# Patient Record
Sex: FEMALE | Race: WHITE | NOT HISPANIC OR LATINO
[De-identification: names, ages, dates, MRNs, and addresses within clinical notes are randomized per-mention and may not be internally consistent; named-entity substitution may affect disease eponyms.]

---

## 2017-06-19 ENCOUNTER — RESULT REVIEW (OUTPATIENT)
Age: 47
End: 2017-06-19

## 2018-05-22 ENCOUNTER — RESULT REVIEW (OUTPATIENT)
Age: 48
End: 2018-05-22

## 2019-12-09 ENCOUNTER — RESULT REVIEW (OUTPATIENT)
Age: 49
End: 2019-12-09

## 2021-04-13 ENCOUNTER — APPOINTMENT (OUTPATIENT)
Dept: SURGERY | Facility: CLINIC | Age: 51
End: 2021-04-13
Payer: COMMERCIAL

## 2021-04-13 VITALS
OXYGEN SATURATION: 97 % | HEIGHT: 61 IN | WEIGHT: 138 LBS | HEART RATE: 87 BPM | DIASTOLIC BLOOD PRESSURE: 81 MMHG | TEMPERATURE: 98.2 F | BODY MASS INDEX: 26.06 KG/M2 | SYSTOLIC BLOOD PRESSURE: 117 MMHG

## 2021-04-13 DIAGNOSIS — Z87.09 PERSONAL HISTORY OF OTHER DISEASES OF THE RESPIRATORY SYSTEM: ICD-10-CM

## 2021-04-13 DIAGNOSIS — Z78.9 OTHER SPECIFIED HEALTH STATUS: ICD-10-CM

## 2021-04-13 DIAGNOSIS — Z80.0 FAMILY HISTORY OF MALIGNANT NEOPLASM OF DIGESTIVE ORGANS: ICD-10-CM

## 2021-04-13 DIAGNOSIS — Z86.39 PERSONAL HISTORY OF OTHER ENDOCRINE, NUTRITIONAL AND METABOLIC DISEASE: ICD-10-CM

## 2021-04-13 DIAGNOSIS — T78.2XXA ANAPHYLACTIC SHOCK, UNSPECIFIED, INITIAL ENCOUNTER: ICD-10-CM

## 2021-04-13 PROCEDURE — 99072 ADDL SUPL MATRL&STAF TM PHE: CPT

## 2021-04-13 PROCEDURE — 99204 OFFICE O/P NEW MOD 45 MIN: CPT

## 2021-04-13 RX ORDER — ROSUVASTATIN CALCIUM 10 MG/1
10 TABLET, FILM COATED ORAL
Refills: 0 | Status: ACTIVE | COMMUNITY

## 2021-04-13 NOTE — REVIEW OF SYSTEMS
[Fever] : no fever [Chills] : no chills [Feeling Poorly] : feeling poorly [Feeling Tired] : not feeling tired [Recent Weight Gain (___ Lbs)] : no recent weight gain [Recent Weight Loss (___ Lbs)] : recent [unfilled] ~Ulb weight loss [Abdominal Pain] : abdominal pain [Vomiting] : no vomiting [Constipation] : no constipation [Diarrhea] : no diarrhea [Heartburn] : no heartburn [Melena] : no melena [Negative] : Heme/Lymph

## 2021-04-13 NOTE — HISTORY OF PRESENT ILLNESS
[de-identified] : right upper quadrant pain [de-identified] : 51 year old white female who presents c/o episodes of RUQ pain for the last three months. Pt states her pain is increased with eating without alleviating factors. Her pain is midepigastric without radiation. She get nauseous, without vomiting. She denies fevers, chills or night sweats. She has not had any episodes of jaundice. Family history is negative for gallbladder disease. Pt had a full work up including a sonogram that was negative, a ct scan that showed sludge in the gallbladder, a cck hida that showed an EF of 37 percent and an EGD that was negative. She also had a trial of multiple meds, including antispasmodics, without relief.

## 2021-04-13 NOTE — ASSESSMENT
[FreeTextEntry1] : Sonogram, ct scan of abdomen and pelvis, CCK HIDA scan and labs reviewed. I have also discussed with pt doctor, Dr Foy< and her EGD was negative. Her amylase was fractionated and it was pancreatic. \par I have discussed with pt and sent her for a second GI opion with Dr Lam

## 2021-04-13 NOTE — PHYSICAL EXAM
[JVD] : no jugular venous distention  [Normal Thyroid] : the thyroid was normal [Carotid Bruits] : no carotid bruits [Normal Breath Sounds] : Normal breath sounds [Normal Heart Sounds] : normal heart sounds [Normal Rate and Rhythm] : normal rate and rhythm [No Rash or Lesion] : No rash or lesion [Alert] : alert [Oriented to Person] : oriented to person [Oriented to Place] : oriented to place [Oriented to Time] : oriented to time [Calm] : calm [de-identified] : well developed white female in no acute distress [de-identified] : noninjected and nonicteric [de-identified] : without adenopathy [de-identified] : refused [de-identified] : normal bowel sounds, without distension, with midepigastric tenderness [de-identified] : without hernia [de-identified] : refused [de-identified] : without calf pain

## 2021-04-15 ENCOUNTER — APPOINTMENT (OUTPATIENT)
Dept: SURGERY | Facility: CLINIC | Age: 51
End: 2021-04-15
Payer: COMMERCIAL

## 2021-04-15 VITALS — TEMPERATURE: 97.2 F

## 2021-04-15 PROCEDURE — 99072 ADDL SUPL MATRL&STAF TM PHE: CPT

## 2021-04-15 PROCEDURE — 99214 OFFICE O/P EST MOD 30 MIN: CPT

## 2021-04-15 NOTE — HISTORY OF PRESENT ILLNESS
[de-identified] : right upper quadrant pain [de-identified] : 51 year old white female who presents c/o episodes of RUQ pain for the last three months. Pt states her pain is increased with eating without alleviating factors. Her pain is midepigastric without radiation. She get nauseous, without vomiting. She denies fevers, chills or night sweats. She has not had any episodes of jaundice. Family history is negative for gallbladder disease. Pt had a full work up including a sonogram that was negative, a ct scan that showed sludge in the gallbladder, a cck hida that showed an EF of 37 percent and an EGD that was negative. She also had a trial of multiple meds, including antispasmodics, without relief.\par Followup; pt was seen by Dr Torres for a second opinion and agrees that she should have her gallbladder removed.

## 2021-04-15 NOTE — ASSESSMENT
[FreeTextEntry1] : I have discussed with pt and  that her indications of a gallbladder problem are conflicting, ie normal sonogram and sludge on ct scan, low EF on CCK HIDA scan but still in normal range. Pt had a full GI workup including an EGD, sonogram, ct scan, CCK HIDA and MRI and I also sent her for a second GI opinion who agrees that she need a laparoscopic cholecystectomy. I have also discussed with them that surgery may not relieve all of her symptoms. Both understand and risks, benefits and alternatives have been discussed. Ms Goldberg wants to schedule a laparoscopic cholecystectomy.

## 2021-04-15 NOTE — PHYSICAL EXAM
[Normal Breath Sounds] : Normal breath sounds [Normal Heart Sounds] : normal heart sounds [No Rash or Lesion] : No rash or lesion [Alert] : alert [Oriented to Person] : oriented to person [Oriented to Place] : oriented to place [Oriented to Time] : oriented to time [Calm] : calm [de-identified] : well developed white female in no acute distress [de-identified] : nonicteric [de-identified] : normal bowel sounds, without distension, with RUQ tenderness [de-identified] : without calf pain or swelling.

## 2021-04-19 ENCOUNTER — OUTPATIENT (OUTPATIENT)
Dept: OUTPATIENT SERVICES | Facility: HOSPITAL | Age: 51
LOS: 1 days | End: 2021-04-19
Payer: COMMERCIAL

## 2021-04-19 VITALS
OXYGEN SATURATION: 99 % | HEIGHT: 61 IN | HEART RATE: 97 BPM | TEMPERATURE: 98 F | WEIGHT: 134.04 LBS | RESPIRATION RATE: 17 BRPM | DIASTOLIC BLOOD PRESSURE: 86 MMHG | SYSTOLIC BLOOD PRESSURE: 128 MMHG

## 2021-04-19 DIAGNOSIS — R10.11 RIGHT UPPER QUADRANT PAIN: ICD-10-CM

## 2021-04-19 DIAGNOSIS — Z98.89 OTHER SPECIFIED POSTPROCEDURAL STATES: Chronic | ICD-10-CM

## 2021-04-19 DIAGNOSIS — Z01.818 ENCOUNTER FOR OTHER PREPROCEDURAL EXAMINATION: ICD-10-CM

## 2021-04-19 LAB
ALBUMIN SERPL ELPH-MCNC: 4.1 G/DL — SIGNIFICANT CHANGE UP (ref 3.3–5)
ALP SERPL-CCNC: 83 U/L — SIGNIFICANT CHANGE UP (ref 40–120)
ALT FLD-CCNC: 19 U/L — SIGNIFICANT CHANGE UP (ref 12–78)
ANION GAP SERPL CALC-SCNC: 4 MMOL/L — LOW (ref 5–17)
AST SERPL-CCNC: 16 U/L — SIGNIFICANT CHANGE UP (ref 15–37)
BILIRUB SERPL-MCNC: 0.9 MG/DL — SIGNIFICANT CHANGE UP (ref 0.2–1.2)
BUN SERPL-MCNC: 14 MG/DL — SIGNIFICANT CHANGE UP (ref 7–23)
CALCIUM SERPL-MCNC: 9.6 MG/DL — SIGNIFICANT CHANGE UP (ref 8.5–10.1)
CHLORIDE SERPL-SCNC: 106 MMOL/L — SIGNIFICANT CHANGE UP (ref 96–108)
CO2 SERPL-SCNC: 31 MMOL/L — SIGNIFICANT CHANGE UP (ref 22–31)
CREAT SERPL-MCNC: 0.77 MG/DL — SIGNIFICANT CHANGE UP (ref 0.5–1.3)
GLUCOSE SERPL-MCNC: 81 MG/DL — SIGNIFICANT CHANGE UP (ref 70–99)
HCT VFR BLD CALC: 42.6 % — SIGNIFICANT CHANGE UP (ref 34.5–45)
HGB BLD-MCNC: 14.1 G/DL — SIGNIFICANT CHANGE UP (ref 11.5–15.5)
MCHC RBC-ENTMCNC: 28.9 PG — SIGNIFICANT CHANGE UP (ref 27–34)
MCHC RBC-ENTMCNC: 33.1 GM/DL — SIGNIFICANT CHANGE UP (ref 32–36)
MCV RBC AUTO: 87.3 FL — SIGNIFICANT CHANGE UP (ref 80–100)
NRBC # BLD: 0 /100 WBCS — SIGNIFICANT CHANGE UP (ref 0–0)
PLATELET # BLD AUTO: 271 K/UL — SIGNIFICANT CHANGE UP (ref 150–400)
POTASSIUM SERPL-MCNC: 4.1 MMOL/L — SIGNIFICANT CHANGE UP (ref 3.5–5.3)
POTASSIUM SERPL-SCNC: 4.1 MMOL/L — SIGNIFICANT CHANGE UP (ref 3.5–5.3)
PROT SERPL-MCNC: 7.6 G/DL — SIGNIFICANT CHANGE UP (ref 6–8.3)
RBC # BLD: 4.88 M/UL — SIGNIFICANT CHANGE UP (ref 3.8–5.2)
RBC # FLD: 12.4 % — SIGNIFICANT CHANGE UP (ref 10.3–14.5)
SARS-COV-2 RNA SPEC QL NAA+PROBE: SIGNIFICANT CHANGE UP
SODIUM SERPL-SCNC: 141 MMOL/L — SIGNIFICANT CHANGE UP (ref 135–145)
WBC # BLD: 5.11 K/UL — SIGNIFICANT CHANGE UP (ref 3.8–10.5)
WBC # FLD AUTO: 5.11 K/UL — SIGNIFICANT CHANGE UP (ref 3.8–10.5)

## 2021-04-19 PROCEDURE — 86901 BLOOD TYPING SEROLOGIC RH(D): CPT

## 2021-04-19 PROCEDURE — U0005: CPT

## 2021-04-19 PROCEDURE — 86850 RBC ANTIBODY SCREEN: CPT

## 2021-04-19 PROCEDURE — U0003: CPT

## 2021-04-19 PROCEDURE — 80053 COMPREHEN METABOLIC PANEL: CPT

## 2021-04-19 PROCEDURE — G0463: CPT

## 2021-04-19 PROCEDURE — 36415 COLL VENOUS BLD VENIPUNCTURE: CPT

## 2021-04-19 PROCEDURE — 86900 BLOOD TYPING SEROLOGIC ABO: CPT

## 2021-04-19 PROCEDURE — 85027 COMPLETE CBC AUTOMATED: CPT

## 2021-04-19 NOTE — H&P PST ADULT - MUSCULOSKELETAL COMMENTS
Right knee -  mild edema, mild tenderness to medial aspect, no erythema Occasional lower back pain, See HPI

## 2021-04-19 NOTE — H&P PST ADULT - NSICDXFAMILYHX_GEN_ALL_CORE_FT
FAMILY HISTORY:  Father  Still living? Yes, Estimated age: 71-80  History of hypertension, Age at diagnosis: Age Unknown

## 2021-04-19 NOTE — H&P PST ADULT - ASSESSMENT
This51 yo female with a PMHX of HLD, Mild Asthma, premature ovarian failure and Tourettes   presents to PST for a scheduled Laparoscopic Cholecystectomy with Dr Sharif  on 4/21/21 .

## 2021-04-19 NOTE — H&P PST ADULT - NSANTHOSAYNRD_GEN_A_CORE
No. ÁLVARO screening performed.  STOP BANG Legend: 0-2 = LOW Risk; 3-4 = INTERMEDIATE Risk; 5-8 = HIGH Risk

## 2021-04-19 NOTE — H&P PST ADULT - HISTORY OF PRESENT ILLNESS
This   presents to PST for a scheduled   with Dr trujillo  .  This51 yo female with a PMHX of HLD, Mild Asthma, premature ovarian failure and Tourette's   presents to [facility] for a scheduled Laparoscopic Cholecystectomy with Dr Sharif  on 4/21/21 . She developed abdominal discomfort and bloating in 2/21 which "was not like my normal lactose intolerance and GI stuff". Her workup showed GB disfunction at 37%. She is easily nauseated with any food.  She denies any fever,chills, abdominal pain, changes in bowels today. Pt is electing to have this procedure.

## 2021-04-19 NOTE — H&P PST ADULT - NSICDXPASTSURGICALHX_GEN_ALL_CORE_FT
PAST SURGICAL HISTORY:  History of arthroscopy of both knees right 20 yrs ago , left 2013    S/P carpal tunnel release (Right, 2015 & Left 4/2016)

## 2021-04-19 NOTE — H&P PST ADULT - NSICDXPROBLEM_GEN_ALL_CORE_FT
PROBLEM DIAGNOSES  Problem: RUQ pain  Assessment and Plan: PST: CBC,CMP,T&S  Medical evaluation with Dr. Gomez  All preoperative instructions including CHD cleanse reviewed with patient who verbalized understanding.   Avoid all NSAID/ASA containing products as well as all herbal/vitamin supplements. Tylenol only for pain/headache.   Covid swab at [place] date TBD. Pt verbalized understanding.

## 2021-04-19 NOTE — H&P PST ADULT - NSICDXPASTMEDICALHX_GEN_ALL_CORE_FT
PAST MEDICAL HISTORY:  Asthma (Only with seasonal allergies)    Fibromyalgia     History of hyperlipidemia     Tourettes disorder ("Mild tic")

## 2021-04-20 ENCOUNTER — TRANSCRIPTION ENCOUNTER (OUTPATIENT)
Age: 51
End: 2021-04-20

## 2021-04-20 NOTE — ASU PATIENT PROFILE, ADULT - PSH
History of arthroscopy of both knees  right 20 yrs ago , left 2013  S/P carpal tunnel release  (Right, 2015 & Left 4/2016)

## 2021-04-20 NOTE — ASU PATIENT PROFILE, ADULT - PMH
Asthma  (Only with seasonal allergies)  Fibromyalgia    History of hyperlipidemia    Tourettes disorder  ("Mild tic")

## 2021-04-21 ENCOUNTER — OUTPATIENT (OUTPATIENT)
Dept: OUTPATIENT SERVICES | Facility: HOSPITAL | Age: 51
LOS: 1 days | End: 2021-04-21
Payer: COMMERCIAL

## 2021-04-21 ENCOUNTER — RESULT REVIEW (OUTPATIENT)
Age: 51
End: 2021-04-21

## 2021-04-21 ENCOUNTER — APPOINTMENT (OUTPATIENT)
Dept: SURGERY | Facility: HOSPITAL | Age: 51
End: 2021-04-21

## 2021-04-21 VITALS
SYSTOLIC BLOOD PRESSURE: 148 MMHG | RESPIRATION RATE: 20 BRPM | DIASTOLIC BLOOD PRESSURE: 85 MMHG | WEIGHT: 138.01 LBS | HEIGHT: 61.6 IN | OXYGEN SATURATION: 97 % | TEMPERATURE: 98 F | HEART RATE: 86 BPM

## 2021-04-21 VITALS
SYSTOLIC BLOOD PRESSURE: 126 MMHG | OXYGEN SATURATION: 100 % | HEART RATE: 74 BPM | RESPIRATION RATE: 13 BRPM | DIASTOLIC BLOOD PRESSURE: 74 MMHG

## 2021-04-21 DIAGNOSIS — Z98.89 OTHER SPECIFIED POSTPROCEDURAL STATES: Chronic | ICD-10-CM

## 2021-04-21 DIAGNOSIS — R10.11 RIGHT UPPER QUADRANT PAIN: ICD-10-CM

## 2021-04-21 PROCEDURE — 47562 LAPAROSCOPIC CHOLECYSTECTOMY: CPT

## 2021-04-21 PROCEDURE — 88304 TISSUE EXAM BY PATHOLOGIST: CPT | Mod: 26

## 2021-04-21 PROCEDURE — 88304 TISSUE EXAM BY PATHOLOGIST: CPT

## 2021-04-21 PROCEDURE — 47562 LAPAROSCOPIC CHOLECYSTECTOMY: CPT | Mod: AS

## 2021-04-21 PROCEDURE — C1889: CPT

## 2021-04-21 RX ORDER — METOCLOPRAMIDE HCL 10 MG
5 TABLET ORAL ONCE
Refills: 0 | Status: COMPLETED | OUTPATIENT
Start: 2021-04-21 | End: 2021-04-21

## 2021-04-21 RX ORDER — OXYCODONE AND ACETAMINOPHEN 5; 325 MG/1; MG/1
1 TABLET ORAL
Qty: 15 | Refills: 0
Start: 2021-04-21

## 2021-04-21 RX ORDER — HYDROMORPHONE HYDROCHLORIDE 2 MG/ML
0.5 INJECTION INTRAMUSCULAR; INTRAVENOUS; SUBCUTANEOUS
Refills: 0 | Status: DISCONTINUED | OUTPATIENT
Start: 2021-04-21 | End: 2021-04-21

## 2021-04-21 RX ORDER — SODIUM CHLORIDE 9 MG/ML
1000 INJECTION, SOLUTION INTRAVENOUS
Refills: 0 | Status: DISCONTINUED | OUTPATIENT
Start: 2021-04-21 | End: 2021-04-21

## 2021-04-21 RX ORDER — OXYCODONE HYDROCHLORIDE 5 MG/1
5 TABLET ORAL ONCE
Refills: 0 | Status: DISCONTINUED | OUTPATIENT
Start: 2021-04-21 | End: 2021-04-21

## 2021-04-21 RX ADMIN — HYDROMORPHONE HYDROCHLORIDE 0.5 MILLIGRAM(S): 2 INJECTION INTRAMUSCULAR; INTRAVENOUS; SUBCUTANEOUS at 11:52

## 2021-04-21 RX ADMIN — SODIUM CHLORIDE 100 MILLILITER(S): 9 INJECTION, SOLUTION INTRAVENOUS at 11:41

## 2021-04-21 RX ADMIN — HYDROMORPHONE HYDROCHLORIDE 0.5 MILLIGRAM(S): 2 INJECTION INTRAMUSCULAR; INTRAVENOUS; SUBCUTANEOUS at 12:17

## 2021-04-21 RX ADMIN — HYDROMORPHONE HYDROCHLORIDE 0.5 MILLIGRAM(S): 2 INJECTION INTRAMUSCULAR; INTRAVENOUS; SUBCUTANEOUS at 12:02

## 2021-04-21 RX ADMIN — HYDROMORPHONE HYDROCHLORIDE 0.5 MILLIGRAM(S): 2 INJECTION INTRAMUSCULAR; INTRAVENOUS; SUBCUTANEOUS at 12:34

## 2021-04-21 RX ADMIN — HYDROMORPHONE HYDROCHLORIDE 0.5 MILLIGRAM(S): 2 INJECTION INTRAMUSCULAR; INTRAVENOUS; SUBCUTANEOUS at 12:07

## 2021-04-21 RX ADMIN — Medication 5 MILLIGRAM(S): at 11:51

## 2021-04-21 RX ADMIN — HYDROMORPHONE HYDROCHLORIDE 0.5 MILLIGRAM(S): 2 INJECTION INTRAMUSCULAR; INTRAVENOUS; SUBCUTANEOUS at 12:24

## 2021-04-21 NOTE — ASU DISCHARGE PLAN (ADULT/PEDIATRIC) - CARE PROVIDER_API CALL
Osmel Sharif)  Surgery  [address] [place] [address] [phone]  Phone: [phone]  Fax: [phone]  Follow Up Time:

## 2021-04-21 NOTE — BRIEF OPERATIVE NOTE - NSICDXBRIEFPOSTOP_GEN_ALL_CORE_FT
[facility] POST-OP DIAGNOSIS:  Biliary dyskinesia 21-Apr-2021 11:37:50  Roxana Damon   [facility]

## 2021-04-22 LAB — SURGICAL PATHOLOGY STUDY: SIGNIFICANT CHANGE UP

## 2021-04-24 ENCOUNTER — EMERGENCY (EMERGENCY)
Facility: HOSPITAL | Age: 51
LOS: 1 days | Discharge: HOME | End: 2021-04-24
Attending: EMERGENCY MEDICINE | Admitting: EMERGENCY MEDICINE
Payer: COMMERCIAL

## 2021-04-24 VITALS
HEIGHT: 61.6 IN | TEMPERATURE: 97 F | DIASTOLIC BLOOD PRESSURE: 97 MMHG | WEIGHT: 136.03 LBS | OXYGEN SATURATION: 97 % | SYSTOLIC BLOOD PRESSURE: 144 MMHG | RESPIRATION RATE: 16 BRPM | HEART RATE: 78 BPM

## 2021-04-24 VITALS
SYSTOLIC BLOOD PRESSURE: 134 MMHG | RESPIRATION RATE: 15 BRPM | TEMPERATURE: 98 F | HEART RATE: 69 BPM | OXYGEN SATURATION: 99 % | DIASTOLIC BLOOD PRESSURE: 89 MMHG

## 2021-04-24 DIAGNOSIS — Z98.89 OTHER SPECIFIED POSTPROCEDURAL STATES: Chronic | ICD-10-CM

## 2021-04-24 DIAGNOSIS — R10.9 UNSPECIFIED ABDOMINAL PAIN: ICD-10-CM

## 2021-04-24 DIAGNOSIS — Z90.49 ACQUIRED ABSENCE OF OTHER SPECIFIED PARTS OF DIGESTIVE TRACT: Chronic | ICD-10-CM

## 2021-04-24 LAB
ALBUMIN SERPL ELPH-MCNC: 3.4 G/DL — SIGNIFICANT CHANGE UP (ref 3.3–5)
ALP SERPL-CCNC: 104 U/L — SIGNIFICANT CHANGE UP (ref 40–120)
ALT FLD-CCNC: 262 U/L — HIGH (ref 12–78)
ANION GAP SERPL CALC-SCNC: 5 MMOL/L — SIGNIFICANT CHANGE UP (ref 5–17)
APTT BLD: 29.3 SEC — SIGNIFICANT CHANGE UP (ref 27.5–35.5)
AST SERPL-CCNC: 201 U/L — HIGH (ref 15–37)
BASOPHILS # BLD AUTO: 0.03 K/UL — SIGNIFICANT CHANGE UP (ref 0–0.2)
BASOPHILS NFR BLD AUTO: 0.5 % — SIGNIFICANT CHANGE UP (ref 0–2)
BILIRUB SERPL-MCNC: 0.5 MG/DL — SIGNIFICANT CHANGE UP (ref 0.2–1.2)
BUN SERPL-MCNC: 10 MG/DL — SIGNIFICANT CHANGE UP (ref 7–23)
CALCIUM SERPL-MCNC: 8.5 MG/DL — SIGNIFICANT CHANGE UP (ref 8.5–10.1)
CHLORIDE SERPL-SCNC: 107 MMOL/L — SIGNIFICANT CHANGE UP (ref 96–108)
CO2 SERPL-SCNC: 30 MMOL/L — SIGNIFICANT CHANGE UP (ref 22–31)
CREAT SERPL-MCNC: 0.88 MG/DL — SIGNIFICANT CHANGE UP (ref 0.5–1.3)
EOSINOPHIL # BLD AUTO: 0.19 K/UL — SIGNIFICANT CHANGE UP (ref 0–0.5)
EOSINOPHIL NFR BLD AUTO: 3.1 % — SIGNIFICANT CHANGE UP (ref 0–6)
GLUCOSE SERPL-MCNC: 91 MG/DL — SIGNIFICANT CHANGE UP (ref 70–99)
HCG SERPL-ACNC: 4 MIU/ML — SIGNIFICANT CHANGE UP
HCT VFR BLD CALC: 37.4 % — SIGNIFICANT CHANGE UP (ref 34.5–45)
HGB BLD-MCNC: 12.5 G/DL — SIGNIFICANT CHANGE UP (ref 11.5–15.5)
IMM GRANULOCYTES NFR BLD AUTO: 0.2 % — SIGNIFICANT CHANGE UP (ref 0–1.5)
INR BLD: 1.01 RATIO — SIGNIFICANT CHANGE UP (ref 0.88–1.16)
LIDOCAIN IGE QN: 88 U/L — SIGNIFICANT CHANGE UP (ref 73–393)
LYMPHOCYTES # BLD AUTO: 1.63 K/UL — SIGNIFICANT CHANGE UP (ref 1–3.3)
LYMPHOCYTES # BLD AUTO: 26.8 % — SIGNIFICANT CHANGE UP (ref 13–44)
MCHC RBC-ENTMCNC: 29.3 PG — SIGNIFICANT CHANGE UP (ref 27–34)
MCHC RBC-ENTMCNC: 33.4 GM/DL — SIGNIFICANT CHANGE UP (ref 32–36)
MCV RBC AUTO: 87.8 FL — SIGNIFICANT CHANGE UP (ref 80–100)
MONOCYTES # BLD AUTO: 0.57 K/UL — SIGNIFICANT CHANGE UP (ref 0–0.9)
MONOCYTES NFR BLD AUTO: 9.4 % — SIGNIFICANT CHANGE UP (ref 2–14)
NEUTROPHILS # BLD AUTO: 3.66 K/UL — SIGNIFICANT CHANGE UP (ref 1.8–7.4)
NEUTROPHILS NFR BLD AUTO: 60 % — SIGNIFICANT CHANGE UP (ref 43–77)
NRBC # BLD: 0 /100 WBCS — SIGNIFICANT CHANGE UP (ref 0–0)
PLATELET # BLD AUTO: 230 K/UL — SIGNIFICANT CHANGE UP (ref 150–400)
POTASSIUM SERPL-MCNC: 4 MMOL/L — SIGNIFICANT CHANGE UP (ref 3.5–5.3)
POTASSIUM SERPL-SCNC: 4 MMOL/L — SIGNIFICANT CHANGE UP (ref 3.5–5.3)
PROT SERPL-MCNC: 6.4 G/DL — SIGNIFICANT CHANGE UP (ref 6–8.3)
PROTHROM AB SERPL-ACNC: 11.8 SEC — SIGNIFICANT CHANGE UP (ref 10.6–13.6)
RBC # BLD: 4.26 M/UL — SIGNIFICANT CHANGE UP (ref 3.8–5.2)
RBC # FLD: 12.6 % — SIGNIFICANT CHANGE UP (ref 10.3–14.5)
SODIUM SERPL-SCNC: 142 MMOL/L — SIGNIFICANT CHANGE UP (ref 135–145)
WBC # BLD: 6.09 K/UL — SIGNIFICANT CHANGE UP (ref 3.8–10.5)
WBC # FLD AUTO: 6.09 K/UL — SIGNIFICANT CHANGE UP (ref 3.8–10.5)

## 2021-04-24 PROCEDURE — 96375 TX/PRO/DX INJ NEW DRUG ADDON: CPT

## 2021-04-24 PROCEDURE — 36415 COLL VENOUS BLD VENIPUNCTURE: CPT

## 2021-04-24 PROCEDURE — 99284 EMERGENCY DEPT VISIT MOD MDM: CPT | Mod: 25

## 2021-04-24 PROCEDURE — 74176 CT ABD & PELVIS W/O CONTRAST: CPT

## 2021-04-24 PROCEDURE — 85730 THROMBOPLASTIN TIME PARTIAL: CPT

## 2021-04-24 PROCEDURE — 84702 CHORIONIC GONADOTROPIN TEST: CPT

## 2021-04-24 PROCEDURE — 83690 ASSAY OF LIPASE: CPT

## 2021-04-24 PROCEDURE — 80053 COMPREHEN METABOLIC PANEL: CPT

## 2021-04-24 PROCEDURE — 74176 CT ABD & PELVIS W/O CONTRAST: CPT | Mod: 26,MA

## 2021-04-24 PROCEDURE — 99053 MED SERV 10PM-8AM 24 HR FAC: CPT

## 2021-04-24 PROCEDURE — 85025 COMPLETE CBC W/AUTO DIFF WBC: CPT

## 2021-04-24 PROCEDURE — 96374 THER/PROPH/DIAG INJ IV PUSH: CPT

## 2021-04-24 PROCEDURE — 99285 EMERGENCY DEPT VISIT HI MDM: CPT

## 2021-04-24 PROCEDURE — 85610 PROTHROMBIN TIME: CPT

## 2021-04-24 PROCEDURE — 99282 EMERGENCY DEPT VISIT SF MDM: CPT

## 2021-04-24 RX ORDER — MORPHINE SULFATE 50 MG/1
4 CAPSULE, EXTENDED RELEASE ORAL ONCE
Refills: 0 | Status: DISCONTINUED | OUTPATIENT
Start: 2021-04-24 | End: 2021-04-24

## 2021-04-24 RX ORDER — ONDANSETRON 8 MG/1
4 TABLET, FILM COATED ORAL ONCE
Refills: 0 | Status: COMPLETED | OUTPATIENT
Start: 2021-04-24 | End: 2021-04-24

## 2021-04-24 RX ADMIN — MORPHINE SULFATE 4 MILLIGRAM(S): 50 CAPSULE, EXTENDED RELEASE ORAL at 01:03

## 2021-04-24 RX ADMIN — ONDANSETRON 4 MILLIGRAM(S): 8 TABLET, FILM COATED ORAL at 01:03

## 2021-04-24 NOTE — ED PROVIDER NOTE - CLINICAL SUMMARY MEDICAL DECISION MAKING FREE TEXT BOX
r/o surgical complication r/o sbo, CT /ap, labs, pt declining analgesia/antiemetics, surgical PA called for consult

## 2021-04-24 NOTE — ED PROVIDER NOTE - NSFOLLOWUPINSTRUCTIONS_ED_ALL_ED_FT
1) Follow-up with Dr. Sharif. Call today / next business day for prompt follow-up.  2) Return to Emergency room for any worsening or persistent pain, weakness, fever, or any other concerning symptoms.  3) See attached instruction sheets for additional information, including information regarding signs and symptoms to look out for, reasons to seek immediate care and other important instructions.  4) Follow-up with any specialists as discussed / noted as well.      WHAT YOU NEED TO KNOW:    Abdominal pain can be dull, achy, or sharp. You may have pain in one area of your abdomen, or in your entire abdomen. Your pain may be caused by a condition such as constipation, food sensitivity or poisoning, infection, or a blockage. Abdominal pain can also be from a hernia, appendicitis, or an ulcer. Liver, gallbladder, or kidney conditions can also cause abdominal pain. The cause of your abdominal pain may be unknown.     DISCHARGE INSTRUCTIONS:    Return to the emergency department if:   •You have new chest pain or shortness of breath.      •You have pulsing pain in your upper abdomen or lower back that suddenly becomes constant.      •Your pain is in the right lower abdominal area and worsens with movement.       •You have a fever over 100.4°F (38°C) or shaking chills.       •You are vomiting and cannot keep food or liquids down.       •Your pain does not improve or gets worse over the next 8 to 12 hours.       •You see blood in your vomit or bowel movements, or they look black and tarry.       •Your skin or the whites of your eyes turn yellow.       •You are a woman and have a large amount of vaginal bleeding that is not your monthly period.       Contact your healthcare provider if:   •You have pain in your lower back.       •You are a man and have pain in your testicles.      •You have pain when you urinate.       •You have questions or concerns about your condition or care.      Follow up with your healthcare provider within 24 hours or as directed: Write down your questions so you remember to ask them during your visits.     Medicines:   •Medicines may be given to calm your stomach and prevent vomiting or to decrease pain. Ask how to take pain medicine safely.      •Take your medicine as directed. Contact your healthcare provider if you think your medicine is not helping or if you have side effects. Tell him of her if you are allergic to any medicine. Keep a list of the medicines, vitamins, and herbs you take. Include the amounts, and when and why you take them. Bring the list or the pill bottles to follow-up visits. Carry your medicine list with you in case of an emergency.

## 2021-04-24 NOTE — ED PROVIDER NOTE - PROGRESS NOTE DETAILS
discussed case with surgical PA, recommend f/u outpatient with Dr. Sharif discussed case with surgical PA, recommend f/u outpatient with Dr. Sharif, patient agreeable with plan has f/u on Monday, feels better, but still in some gassy discomfort, CT findings explained to patient and

## 2021-04-24 NOTE — ED PROVIDER NOTE - PATIENT PORTAL LINK FT
You can access the FollowMyHealth Patient Portal offered by [facility] by registering at the following website: [email or web address]. By joining [employer]’s FollowMyHealth portal, you will also be able to view your health information using other applications (apps) compatible with our system.

## 2021-04-24 NOTE — ED PROVIDER NOTE - PHYSICAL EXAMINATION
Gen: Alert, NAD  Head/eyes: NC/AT, PERRL  ENT: airway patent  Neck: supple, no tenderness/meningismus/JVD, Trachea midline  Pulm/lung: Bilateral clear BS, normal resp effort, no wheeze/stridor/retractions  CV/heart: RRR, no M/R/G, +2 dist pulses (radial, pedal DP/PT, popliteal)  GI/Abd: soft, +ttp mid abdomen, laparoscopic incision site x 4 c/d/i, +mildly distended, +BS, no guarding/rebound tenderness  Musculoskeletal: no edema/erythema/cyanosis, FROM in all extremities, no C/T/L spine ttp  Skin: no rash, no vesicles, no petechaie, no ecchymosis, no swelling  Neuro: AAOx3, CN 2-12 intact, normal sensation, 5/5 motor strength in all extremities, normal gait

## 2021-04-24 NOTE — ED ADULT NURSE NOTE - PSH
History of arthroscopy of both knees  right 20 yrs ago , left 2013  S/P carpal tunnel release  (Right, 2015 & Left 4/2016)  S/P cholecystectomy

## 2021-04-24 NOTE — ED ADULT NURSE NOTE - OBJECTIVE STATEMENT
pt s/p edgar with Dr Sharif this past Wednesday.  was out pt amb surg.  pt went home same day without complications.  pt c/o persistent bloating and generalized abd pain.  -nausea -fever has been taking motrin and percocet with minimal relief.  laparoscopic dressings dry and intact.

## 2021-04-24 NOTE — ED PROVIDER NOTE - OBJECTIVE STATEMENT
50 yo female s/p cholecystectomy by Dr. Sharif 4/21 c/o abdominal bloating/pain, distention, moderate, nonradiating, worse with movement.  No n/v/d.  Admits to slight low grade fever yesterday 100 that went away on its own.  Taking percocet and zofran.

## 2021-04-24 NOTE — ED ADULT NURSE REASSESSMENT NOTE - NS ED NURSE REASSESS COMMENT FT1
pt evaluated at bedside by Dr Aguilera.  IV inserted, blood work drawn and sent to lab.  medicated for pain and nausea, transported to CT scan via stretcher.  plan to be evaluated at bedside by surgical PA.

## 2021-04-24 NOTE — CONSULT NOTE ADULT - SUBJECTIVE AND OBJECTIVE BOX
50 y/o F pmhx HLD, fibromyalgia, tourettes, s/p lap cholecystectomy 4/21 presents to [facility] ED c/o abd pain that has not been improving since after her surgery. PT states the pain is predominantly around the umbilical incision and c/o bloating. PT admits to flatus and regular bowel movements. PT denies chest pain, SOB, palpitations, fevers, chills , melena, or hematochezia.     PAST MEDICAL & SURGICAL HISTORY:  History of hyperlipidemia    Fibromyalgia    Tourettes disorder  (&quot;Mild tic&quot;)    Asthma  (Only with seasonal allergies)    History of arthroscopy of both knees  right 20 yrs ago , left 2013    S/P carpal tunnel release  (Right, 2015 &amp; Left 4/2016)    S/P cholecystectomy      Allergies:  cephalosporins (Anaphylaxis)  contrast media (gadolinium-based) (Anaphylaxis)  iodine (Anaphylaxis)  penicillin (Anaphylaxis)  shellfish (Anaphylaxis)  sulfa drugs (Rash)    Home Medications:  Calcium 600+D oral tablet: 1 tab(s) orally 2 times a day  Crestor 10 mg oral tablet: 1 tab(s) orally once a day (at bedtime)  multivitamin:   e daily  ProAir HFA 90 mcg/inh inhalation aerosol: 2 puff(s) inhaled 4 times a day, As Needed  Vitamin D3 1000 intl units oral capsule: 1 cap(s) orally once a day      Family History:  FAMILY HISTORY:  History of hypertension (Father)        ROS:  Constitutional: Denies fever, fatigue or weight loss.  Skin: Denies rash.  Eyes: Denies recent vision problems or eye pain.  ENT: Denies congestion, ear pain, or sore throat.  Endocrine: Denies thyroid problems.  Cardiovascular: Denies chest pain or palpation.  Respiratory: Denies cough, shortness of breath, congestion, or wheezing.  Gastrointestinal: SEE  HPI  Genitourinary: Denies dysuria.  Musculoskeletal: Denies joint swelling.  Neurologic: Denies headache.      Physical Examination:  GENERAL: No acute distress, well-developed  ABDOMEN: Soft, mild perumbilical incisional ttp, nondistended, +bs, incisional site c/d/i  NEUROLOGY: A&O x 3, no focal deficits    Data:                        12.5   6.09  )-----------( 230      ( 24 Apr 2021 00:55 )             37.4     04-24    142  |  107  |  10  ----------------------------<  91  4.0   |  30  |  0.88    Ca    8.5      24 Apr 2021 00:55    TPro  6.4  /  Alb  3.4  /  TBili  0.5  /  DBili  x   /  AST  201<H>  /  ALT  262<H>  /  AlkPhos  104  04-24      LIVER FUNCTIONS - ( 24 Apr 2021 00:55 )  Alb: 3.4 g/dL / Pro: 6.4 g/dL / ALK PHOS: 104 U/L / ALT: 262 U/L / AST: 201 U/L / GGT: x           Radiology:  < from: CT Abdomen and Pelvis No Cont (04.24.21 @ 01:23) >    EXAM:  CT ABDOMEN AND PELVIS                            PROCEDURE DATE:  04/24/2021          INTERPRETATION:  CLINICAL INFORMATION: Status post cholecystectomy with abdominal pain    COMPARISON: There are no prior studies available for comparison.      TECHNIQUE: CT imaging of the abdomen and pelvis was performed without IV contrast.    FINDINGS:    LOWER CHEST: within normal limits.    ABDOMEN:    Evaluation of the visceral organs and bowel is limited due to lack of IV contrast.    LIVER: within normal limits.  BILE DUCTS: normal caliber.  GALLBLADDER: Surgically absent.  PANCREAS: within normal limits.  SPLEEN: within normal limits.  ADRENALS: within normal limits.  KIDNEYS/URETERS: within normal limits.    PELVIS:  REPRODUCTIVE ORGANS: no pelvic masses.  BLADDER: within normal limits.      BOWEL: No small bowel obstruction or active bowel inflammation. No enlarged mesenteric lymph nodes.  PERITONEUM: Small foci of free air above the liver consistent with postsurgical state. Small volume free fluid in the pelvis.  VESSELS: within normal limits.  RETROPERITONEUM: within normal limits.  ABDOMINAL WALL: Postsurgical changes.  BONES: Degenerative changes. Grade 1 anterolisthesis of L5 on S1.    IMPRESSION:    Status post cholecystectomy with postsurgical changes as described. No intra-abdominal collection.    KARLO FISCHER MD; Attending Radiologist  This document has been electronically signed. Apr 24 2021  1:56AM    < end of copied text >

## 2021-04-24 NOTE — ED PROVIDER NOTE - CARE PROVIDER_API CALL
Osmel Sharif)  Surgery  [address] [place] [address] [phone]  Phone: [phone]  Fax: [phone]  Follow Up Time:   
none

## 2021-04-24 NOTE — CONSULT NOTE ADULT - NSICDXPROBLEMREC1_GEN_ALL_CORE_FT
Assessment:   50 y/o F POD # 2 s/p lap cholecystectomy presenting to [place] ED with abdominal pain, however normal wbc, and elevated ast/alt 2/2 cholecystectomy, with no acute findings on CT abd/pelv, pain likely post-operative,   Plan:  - pain likely post-operative pain given no acute findings on CT abd/pelv and wbc wnl, and abdominal exam with mild ttp along umbilical incision  - surgically cleared for d/c home to follow up in office on Monday  - pain control, supportive care  - discussed with Dr. Sharif

## 2021-04-26 ENCOUNTER — EMERGENCY (EMERGENCY)
Facility: HOSPITAL | Age: 51
LOS: 1 days | Discharge: HOME | End: 2021-04-26
Attending: EMERGENCY MEDICINE | Admitting: EMERGENCY MEDICINE
Payer: COMMERCIAL

## 2021-04-26 VITALS
WEIGHT: 139.99 LBS | RESPIRATION RATE: 18 BRPM | HEART RATE: 92 BPM | SYSTOLIC BLOOD PRESSURE: 149 MMHG | DIASTOLIC BLOOD PRESSURE: 82 MMHG | OXYGEN SATURATION: 98 % | HEIGHT: 61.6 IN | TEMPERATURE: 98 F

## 2021-04-26 VITALS
SYSTOLIC BLOOD PRESSURE: 139 MMHG | TEMPERATURE: 98 F | DIASTOLIC BLOOD PRESSURE: 87 MMHG | HEART RATE: 89 BPM | OXYGEN SATURATION: 98 % | RESPIRATION RATE: 17 BRPM

## 2021-04-26 DIAGNOSIS — Z98.89 OTHER SPECIFIED POSTPROCEDURAL STATES: Chronic | ICD-10-CM

## 2021-04-26 DIAGNOSIS — Z90.49 ACQUIRED ABSENCE OF OTHER SPECIFIED PARTS OF DIGESTIVE TRACT: Chronic | ICD-10-CM

## 2021-04-26 LAB
ALBUMIN SERPL ELPH-MCNC: 3.6 G/DL — SIGNIFICANT CHANGE UP (ref 3.3–5)
ALP SERPL-CCNC: 154 U/L — HIGH (ref 40–120)
ALT FLD-CCNC: 251 U/L — HIGH (ref 12–78)
ANION GAP SERPL CALC-SCNC: 5 MMOL/L — SIGNIFICANT CHANGE UP (ref 5–17)
APPEARANCE UR: CLEAR — SIGNIFICANT CHANGE UP
APTT BLD: 31.2 SEC — SIGNIFICANT CHANGE UP (ref 27.5–35.5)
AST SERPL-CCNC: 79 U/L — HIGH (ref 15–37)
BACTERIA # UR AUTO: NEGATIVE — SIGNIFICANT CHANGE UP
BASOPHILS # BLD AUTO: 0.02 K/UL — SIGNIFICANT CHANGE UP (ref 0–0.2)
BASOPHILS NFR BLD AUTO: 0.4 % — SIGNIFICANT CHANGE UP (ref 0–2)
BILIRUB SERPL-MCNC: 0.6 MG/DL — SIGNIFICANT CHANGE UP (ref 0.2–1.2)
BILIRUB UR-MCNC: NEGATIVE — SIGNIFICANT CHANGE UP
BUN SERPL-MCNC: 10 MG/DL — SIGNIFICANT CHANGE UP (ref 7–23)
CALCIUM SERPL-MCNC: 9.3 MG/DL — SIGNIFICANT CHANGE UP (ref 8.5–10.1)
CHLORIDE SERPL-SCNC: 107 MMOL/L — SIGNIFICANT CHANGE UP (ref 96–108)
CO2 SERPL-SCNC: 30 MMOL/L — SIGNIFICANT CHANGE UP (ref 22–31)
COLOR SPEC: SIGNIFICANT CHANGE UP
CREAT SERPL-MCNC: 0.81 MG/DL — SIGNIFICANT CHANGE UP (ref 0.5–1.3)
DIFF PNL FLD: NEGATIVE — SIGNIFICANT CHANGE UP
EOSINOPHIL # BLD AUTO: 0.12 K/UL — SIGNIFICANT CHANGE UP (ref 0–0.5)
EOSINOPHIL NFR BLD AUTO: 2.5 % — SIGNIFICANT CHANGE UP (ref 0–6)
EPI CELLS # UR: SIGNIFICANT CHANGE UP
GLUCOSE SERPL-MCNC: 88 MG/DL — SIGNIFICANT CHANGE UP (ref 70–99)
GLUCOSE UR QL: NEGATIVE — SIGNIFICANT CHANGE UP
HCT VFR BLD CALC: 40.9 % — SIGNIFICANT CHANGE UP (ref 34.5–45)
HGB BLD-MCNC: 13.7 G/DL — SIGNIFICANT CHANGE UP (ref 11.5–15.5)
IMM GRANULOCYTES NFR BLD AUTO: 0 % — SIGNIFICANT CHANGE UP (ref 0–1.5)
INR BLD: 1.05 RATIO — SIGNIFICANT CHANGE UP (ref 0.88–1.16)
KETONES UR-MCNC: NEGATIVE — SIGNIFICANT CHANGE UP
LEUKOCYTE ESTERASE UR-ACNC: NEGATIVE — SIGNIFICANT CHANGE UP
LIDOCAIN IGE QN: 70 U/L — LOW (ref 73–393)
LYMPHOCYTES # BLD AUTO: 0.79 K/UL — LOW (ref 1–3.3)
LYMPHOCYTES # BLD AUTO: 16.6 % — SIGNIFICANT CHANGE UP (ref 13–44)
MCHC RBC-ENTMCNC: 29.5 PG — SIGNIFICANT CHANGE UP (ref 27–34)
MCHC RBC-ENTMCNC: 33.5 GM/DL — SIGNIFICANT CHANGE UP (ref 32–36)
MCV RBC AUTO: 88 FL — SIGNIFICANT CHANGE UP (ref 80–100)
MONOCYTES # BLD AUTO: 0.47 K/UL — SIGNIFICANT CHANGE UP (ref 0–0.9)
MONOCYTES NFR BLD AUTO: 9.9 % — SIGNIFICANT CHANGE UP (ref 2–14)
NEUTROPHILS # BLD AUTO: 3.35 K/UL — SIGNIFICANT CHANGE UP (ref 1.8–7.4)
NEUTROPHILS NFR BLD AUTO: 70.6 % — SIGNIFICANT CHANGE UP (ref 43–77)
NITRITE UR-MCNC: NEGATIVE — SIGNIFICANT CHANGE UP
NRBC # BLD: 0 /100 WBCS — SIGNIFICANT CHANGE UP (ref 0–0)
PH UR: 7 — SIGNIFICANT CHANGE UP (ref 5–8)
PLATELET # BLD AUTO: 258 K/UL — SIGNIFICANT CHANGE UP (ref 150–400)
POTASSIUM SERPL-MCNC: 3.8 MMOL/L — SIGNIFICANT CHANGE UP (ref 3.5–5.3)
POTASSIUM SERPL-SCNC: 3.8 MMOL/L — SIGNIFICANT CHANGE UP (ref 3.5–5.3)
PROT SERPL-MCNC: 7.3 G/DL — SIGNIFICANT CHANGE UP (ref 6–8.3)
PROT UR-MCNC: NEGATIVE — SIGNIFICANT CHANGE UP
PROTHROM AB SERPL-ACNC: 12.3 SEC — SIGNIFICANT CHANGE UP (ref 10.6–13.6)
RBC # BLD: 4.65 M/UL — SIGNIFICANT CHANGE UP (ref 3.8–5.2)
RBC # FLD: 12.7 % — SIGNIFICANT CHANGE UP (ref 10.3–14.5)
RBC CASTS # UR COMP ASSIST: SIGNIFICANT CHANGE UP /HPF (ref 0–4)
SODIUM SERPL-SCNC: 142 MMOL/L — SIGNIFICANT CHANGE UP (ref 135–145)
SP GR SPEC: 1.01 — SIGNIFICANT CHANGE UP (ref 1.01–1.02)
UROBILINOGEN FLD QL: NEGATIVE — SIGNIFICANT CHANGE UP
WBC # BLD: 4.75 K/UL — SIGNIFICANT CHANGE UP (ref 3.8–10.5)
WBC # FLD AUTO: 4.75 K/UL — SIGNIFICANT CHANGE UP (ref 3.8–10.5)
WBC UR QL: SIGNIFICANT CHANGE UP

## 2021-04-26 PROCEDURE — 76700 US EXAM ABDOM COMPLETE: CPT | Mod: 26

## 2021-04-26 PROCEDURE — 81001 URINALYSIS AUTO W/SCOPE: CPT

## 2021-04-26 PROCEDURE — 99285 EMERGENCY DEPT VISIT HI MDM: CPT

## 2021-04-26 PROCEDURE — 87086 URINE CULTURE/COLONY COUNT: CPT

## 2021-04-26 PROCEDURE — 80053 COMPREHEN METABOLIC PANEL: CPT

## 2021-04-26 PROCEDURE — 78226 HEPATOBILIARY SYSTEM IMAGING: CPT | Mod: 26,MA

## 2021-04-26 PROCEDURE — 85730 THROMBOPLASTIN TIME PARTIAL: CPT

## 2021-04-26 PROCEDURE — 83690 ASSAY OF LIPASE: CPT

## 2021-04-26 PROCEDURE — 99284 EMERGENCY DEPT VISIT MOD MDM: CPT | Mod: 25

## 2021-04-26 PROCEDURE — 78226 HEPATOBILIARY SYSTEM IMAGING: CPT

## 2021-04-26 PROCEDURE — 85610 PROTHROMBIN TIME: CPT

## 2021-04-26 PROCEDURE — 96374 THER/PROPH/DIAG INJ IV PUSH: CPT | Mod: XU

## 2021-04-26 PROCEDURE — 76700 US EXAM ABDOM COMPLETE: CPT

## 2021-04-26 PROCEDURE — A9537: CPT

## 2021-04-26 PROCEDURE — 96361 HYDRATE IV INFUSION ADD-ON: CPT

## 2021-04-26 PROCEDURE — 85025 COMPLETE CBC W/AUTO DIFF WBC: CPT

## 2021-04-26 PROCEDURE — 36415 COLL VENOUS BLD VENIPUNCTURE: CPT

## 2021-04-26 RX ORDER — SODIUM CHLORIDE 9 MG/ML
1000 INJECTION INTRAMUSCULAR; INTRAVENOUS; SUBCUTANEOUS ONCE
Refills: 0 | Status: COMPLETED | OUTPATIENT
Start: 2021-04-26 | End: 2021-04-26

## 2021-04-26 RX ORDER — KETOROLAC TROMETHAMINE 30 MG/ML
15 SYRINGE (ML) INJECTION ONCE
Refills: 0 | Status: DISCONTINUED | OUTPATIENT
Start: 2021-04-26 | End: 2021-04-26

## 2021-04-26 RX ORDER — SODIUM CHLORIDE 9 MG/ML
1000 INJECTION INTRAMUSCULAR; INTRAVENOUS; SUBCUTANEOUS ONCE
Refills: 0 | Status: DISCONTINUED | OUTPATIENT
Start: 2021-04-26 | End: 2021-04-29

## 2021-04-26 RX ADMIN — Medication 10 MILLIGRAM(S): at 11:12

## 2021-04-26 RX ADMIN — SODIUM CHLORIDE 1000 MILLILITER(S): 9 INJECTION INTRAMUSCULAR; INTRAVENOUS; SUBCUTANEOUS at 10:19

## 2021-04-26 RX ADMIN — SODIUM CHLORIDE 1000 MILLILITER(S): 9 INJECTION INTRAMUSCULAR; INTRAVENOUS; SUBCUTANEOUS at 11:48

## 2021-04-26 RX ADMIN — Medication 15 MILLIGRAM(S): at 13:34

## 2021-04-26 RX ADMIN — Medication 15 MILLIGRAM(S): at 13:19

## 2021-04-26 NOTE — ED PROVIDER NOTE - CLINICAL SUMMARY MEDICAL DECISION MAKING FREE TEXT BOX
pt with abd pain s/p lap cholecystectomy on 4/21, seen for same 4/24 had labs/ct - labs/ivf/surg consult pt with abd pain s/p lap cholecystectomy on 4/21, seen for same 4/24 had labs/ct - labs/ivf/surg consult/us/hida

## 2021-04-26 NOTE — ED PROVIDER NOTE - PATIENT PORTAL LINK FT
You can access the FollowMyHealth Patient Portal offered by [facility] by registering at the following website: [email or web address]. By joining [employer]’s FollowMyHealth portal, you will also be able to view your health information using other applications (apps) compatible with our system.

## 2021-04-26 NOTE — CONSULT NOTE ADULT - SUBJECTIVE AND OBJECTIVE BOX
50 y/o F pmhx HLD, fibromyalgia, tourettes, s/p lap cholecystectomy 4/21 presents to [facility] ED w/ cc abd pain that has not been improving since after her surgery. Pt states the pain is predominantly around the umbilical incision. States her bloating has not gone down. Nauseous due to pain but denies vomiting. Patient admits to flatus and regular bowel movements. Taking percocet at home with advil that relieves pain for a few hours.  Patient denies chest pain, SOB, palpitations, fevers, chills , melena, or hematochezia.     PAST MEDICAL & SURGICAL HISTORY:  History of hyperlipidemia    Fibromyalgia    Tourettes disorder    Asthma  (Only with seasonal allergies)    History of arthroscopy of both knees  right 20 yrs ago , left 2013    S/P carpal tunnel release  (Right, 2015 &amp; Left 4/2016)    S/P cholecystectomy      Allergies:  cephalosporins (Anaphylaxis)  contrast media (gadolinium-based) (Anaphylaxis)  iodine (Anaphylaxis)  penicillin (Anaphylaxis)  shellfish (Anaphylaxis)  sulfa drugs (Rash)    Home Medications:  Calcium 600+D oral tablet: 1 tab(s) orally 2 times a day  Crestor 10 mg oral tablet: 1 tab(s) orally once a day (at bedtime)  multivitamin:   e daily  ProAir HFA 90 mcg/inh inhalation aerosol: 2 puff(s) inhaled 4 times a day, As Needed  Vitamin D3 1000 intl units oral capsule: 1 cap(s) orally once a day      Family History:  FAMILY HISTORY:  History of hypertension (Father)        ROS:  Constitutional: Denies fever, fatigue or weight loss.  Skin: Denies rash.  Eyes: Denies recent vision problems or eye pain.  ENT: Denies congestion, ear pain, or sore throat.  Endocrine: Denies thyroid problems.  Cardiovascular: Denies chest pain or palpation.  Respiratory: Denies cough, shortness of breath, congestion, or wheezing.  Gastrointestinal: SEE  HPI  Genitourinary: Denies dysuria.  Musculoskeletal: Denies joint swelling.  Neurologic: Denies headache.      Physical Examination:  GENERAL: No acute distress, well-developed  INCISION: incisional site c/d/i, steri strips in place. Ecchymosis around umbilicus, healing well   ABDOMEN: Soft, mild perumbilical incisional ttp, nondistended,   NEUROLOGY: A&O x 3, no focal deficits    Data:                        13.7  4.75  )-----------( 258      ( 26 Apr 2021 )             40.9       Radiology:  < from: CT Abdomen and Pelvis No Cont (04.24.21 @ 01:23) >    EXAM:  CT ABDOMEN AND PELVIS                          PROCEDURE DATE:  04/24/2021      INTERPRETATION:  CLINICAL INFORMATION: Status post cholecystectomy with abdominal pain    COMPARISON: There are no prior studies available for comparison.      TECHNIQUE: CT imaging of the abdomen and pelvis was performed without IV contrast.    FINDINGS:    LOWER CHEST: within normal limits.    ABDOMEN:    Evaluation of the visceral organs and bowel is limited due to lack of IV contrast.    LIVER: within normal limits.  BILE DUCTS: normal caliber.  GALLBLADDER: Surgically absent.  PANCREAS: within normal limits.  SPLEEN: within normal limits.  ADRENALS: within normal limits.  KIDNEYS/URETERS: within normal limits.    PELVIS:  REPRODUCTIVE ORGANS: no pelvic masses.  BLADDER: within normal limits.      BOWEL: No small bowel obstruction or active bowel inflammation. No enlarged mesenteric lymph nodes.  PERITONEUM: Small foci of free air above the liver consistent with postsurgical state. Small volume free fluid in the pelvis.  VESSELS: within normal limits.  RETROPERITONEUM: within normal limits.  ABDOMINAL WALL: Postsurgical changes.  BONES: Degenerative changes. Grade 1 anterolisthesis of L5 on S1.    IMPRESSION:  Status post cholecystectomy with postsurgical changes as described. No intra-abdominal collection.      ASSESSMENT  50 y/o F POD # 5 s/p lap cholecystectomy presenting to [facility] ED with abdominal pain, however normal wbc, no acute findings on CT abd/pelv, pain likely post-operative,     PLAN  - pain likely post-operative pain   - f/u labs  - to be discussed with Dr. Sharif    50 y/o F pmhx HLD, fibromyalgia, tourettes, s/p lap cholecystectomy 4/21 presents to [place] ED w/ cc abd pain that has not been improving since after her surgery. Pt states the pain is predominantly around the umbilical incision. States her bloating has not gone down. Nauseous due to pain but denies vomiting. Patient admits to flatus and regular bowel movements. Taking percocet at home with advil that relieves pain for a few hours.  Patient denies chest pain, SOB, palpitations, fevers, chills , melena, or hematochezia.     PAST MEDICAL & SURGICAL HISTORY:  History of hyperlipidemia    Fibromyalgia    Tourettes disorder    Asthma  (Only with seasonal allergies)    History of arthroscopy of both knees  right 20 yrs ago , left 2013    S/P carpal tunnel release  (Right, 2015 &amp; Left 4/2016)    S/P cholecystectomy      Allergies:  cephalosporins (Anaphylaxis)  contrast media (gadolinium-based) (Anaphylaxis)  iodine (Anaphylaxis)  penicillin (Anaphylaxis)  shellfish (Anaphylaxis)  sulfa drugs (Rash)    Home Medications:  Calcium 600+D oral tablet: 1 tab(s) orally 2 times a day  Crestor 10 mg oral tablet: 1 tab(s) orally once a day (at bedtime)  multivitamin:   e daily  ProAir HFA 90 mcg/inh inhalation aerosol: 2 puff(s) inhaled 4 times a day, As Needed  Vitamin D3 1000 intl units oral capsule: 1 cap(s) orally once a day      Family History:  FAMILY HISTORY:  History of hypertension (Father)        ROS:  Constitutional: Denies fever, fatigue or weight loss.  Skin: Denies rash.  Eyes: Denies recent vision problems or eye pain.  ENT: Denies congestion, ear pain, or sore throat.  Endocrine: Denies thyroid problems.  Cardiovascular: Denies chest pain or palpation.  Respiratory: Denies cough, shortness of breath, congestion, or wheezing.  Gastrointestinal: SEE  HPI  Genitourinary: Denies dysuria.  Musculoskeletal: Denies joint swelling.  Neurologic: Denies headache.      Physical Examination:  GENERAL: No acute distress, well-developed  INCISION: incisional site c/d/i, steri strips in place. Ecchymosis around umbilicus, healing well   ABDOMEN: Soft, mild perumbilical incisional ttp, nondistended,   NEUROLOGY: A&O x 3, no focal deficits    Data:                        13.7  4.75  )-----------( 258      ( 26 Apr 2021 )             40.9       Radiology:  < from: CT Abdomen and Pelvis No Cont (04.24.21 @ 01:23) >    EXAM:  CT ABDOMEN AND PELVIS                          PROCEDURE DATE:  04/24/2021      INTERPRETATION:  CLINICAL INFORMATION: Status post cholecystectomy with abdominal pain    COMPARISON: There are no prior studies available for comparison.      TECHNIQUE: CT imaging of the abdomen and pelvis was performed without IV contrast.    FINDINGS:    LOWER CHEST: within normal limits.    ABDOMEN:    Evaluation of the visceral organs and bowel is limited due to lack of IV contrast.    LIVER: within normal limits.  BILE DUCTS: normal caliber.  GALLBLADDER: Surgically absent.  PANCREAS: within normal limits.  SPLEEN: within normal limits.  ADRENALS: within normal limits.  KIDNEYS/URETERS: within normal limits.    PELVIS:  REPRODUCTIVE ORGANS: no pelvic masses.  BLADDER: within normal limits.      BOWEL: No small bowel obstruction or active bowel inflammation. No enlarged mesenteric lymph nodes.  PERITONEUM: Small foci of free air above the liver consistent with postsurgical state. Small volume free fluid in the pelvis.  VESSELS: within normal limits.  RETROPERITONEUM: within normal limits.  ABDOMINAL WALL: Postsurgical changes.  BONES: Degenerative changes. Grade 1 anterolisthesis of L5 on S1.    IMPRESSION:  Status post cholecystectomy with postsurgical changes as described. No intra-abdominal collection.      ASSESSMENT  50 y/o F POD # 5 s/p lap cholecystectomy presenting to [place] ED with abdominal pain, however normal wbc, no acute findings on CT abd/pelv, pain likely post-operative,     PLAN  - pain likely post-operative pain   - f/u RUQ US to r/o bile leak or any type of biliary pathology, if neg will recommend HIDA  - give 1x dose bentyl   - discussed with Dr. Sharif    50 y/o F pmhx HLD, fibromyalgia, tourettes, s/p lap cholecystectomy 4/21 presents to [facility] ED w/ cc abd pain that has not been improving since after her surgery. Pt states the pain is predominantly around the umbilical incision. States her bloating has not gone down. Nauseous due to pain but denies vomiting. Patient admits to flatus and regular bowel movements. Taking percocet at home with advil that relieves pain for a few hours.  Patient denies chest pain, SOB, palpitations, fevers, chills , melena, or hematochezia.     PAST MEDICAL & SURGICAL HISTORY:  History of hyperlipidemia    Fibromyalgia    Tourettes disorder    Asthma  (Only with seasonal allergies)    History of arthroscopy of both knees  right 20 yrs ago , left 2013    S/P carpal tunnel release  (Right, 2015 &amp; Left 4/2016)    S/P cholecystectomy      Allergies:  cephalosporins (Anaphylaxis)  contrast media (gadolinium-based) (Anaphylaxis)  iodine (Anaphylaxis)  penicillin (Anaphylaxis)  shellfish (Anaphylaxis)  sulfa drugs (Rash)    Home Medications:  Calcium 600+D oral tablet: 1 tab(s) orally 2 times a day  Crestor 10 mg oral tablet: 1 tab(s) orally once a day (at bedtime)  multivitamin:   e daily  ProAir HFA 90 mcg/inh inhalation aerosol: 2 puff(s) inhaled 4 times a day, As Needed  Vitamin D3 1000 intl units oral capsule: 1 cap(s) orally once a day      Family History:  FAMILY HISTORY:  History of hypertension (Father)        ROS:  Constitutional: Denies fever, fatigue or weight loss.  Skin: Denies rash.  Eyes: Denies recent vision problems or eye pain.  ENT: Denies congestion, ear pain, or sore throat.  Endocrine: Denies thyroid problems.  Cardiovascular: Denies chest pain or palpation.  Respiratory: Denies cough, shortness of breath, congestion, or wheezing.  Gastrointestinal: SEE  HPI  Genitourinary: Denies dysuria.  Musculoskeletal: Denies joint swelling.  Neurologic: Denies headache.      Physical Examination:  GENERAL: No acute distress, well-developed  INCISION: incisional site c/d/i, steri strips in place. Ecchymosis around umbilicus, healing well   ABDOMEN: Soft, mild perumbilical incisional ttp, nondistended,   NEUROLOGY: A&O x 3, no focal deficits    Data:                        13.7  4.75  )-----------( 258      ( 26 Apr 2021 )             40.9       Radiology:  < from: CT Abdomen and Pelvis No Cont (04.24.21 @ 01:23) >    EXAM:  CT ABDOMEN AND PELVIS                          PROCEDURE DATE:  04/24/2021      INTERPRETATION:  CLINICAL INFORMATION: Status post cholecystectomy with abdominal pain    COMPARISON: There are no prior studies available for comparison.      TECHNIQUE: CT imaging of the abdomen and pelvis was performed without IV contrast.    FINDINGS:    LOWER CHEST: within normal limits.    ABDOMEN:    Evaluation of the visceral organs and bowel is limited due to lack of IV contrast.    LIVER: within normal limits.  BILE DUCTS: normal caliber.  GALLBLADDER: Surgically absent.  PANCREAS: within normal limits.  SPLEEN: within normal limits.  ADRENALS: within normal limits.  KIDNEYS/URETERS: within normal limits.    PELVIS:  REPRODUCTIVE ORGANS: no pelvic masses.  BLADDER: within normal limits.      BOWEL: No small bowel obstruction or active bowel inflammation. No enlarged mesenteric lymph nodes.  PERITONEUM: Small foci of free air above the liver consistent with postsurgical state. Small volume free fluid in the pelvis.  VESSELS: within normal limits.  RETROPERITONEUM: within normal limits.  ABDOMINAL WALL: Postsurgical changes.  BONES: Degenerative changes. Grade 1 anterolisthesis of L5 on S1.    IMPRESSION:  Status post cholecystectomy with postsurgical changes as described. No intra-abdominal collection.      ASSESSMENT  50 y/o F POD # 5 s/p lap cholecystectomy presenting to [facility] ED with abdominal pain, however normal wbc, no acute findings on CT abd/pelv, pain likely post-operative,     PLAN  - pain likely post-operative pain   - f/u RUQ US to r/o bile leak or any type of biliary pathology, if neg will recommend HIDA  - give 1x dose bentyl   - discussed with Dr. Sharif       ADDENDUM  HIDA and RUQ US negative for any type of bile leak or biliary pathology  most likely post operative pain  follow up as outpatient

## 2021-04-26 NOTE — ED PROVIDER NOTE - PROGRESS NOTE DETAILS
robert (surg) seen eval pt, requests us and bentyl surgery CHARLIE jones (surg) cleared for d/c and outpt f/u. Reevaluated patient at bedside.  Patient feeling much improved.  Discussed the results of all diagnostic testing in ED and copies of all reports given.   An opportunity to ask questions was given.  Discussed the importance of prompt, close medical follow-up.  Patient will return with any changes, concerns or persistent / worsening symptoms.  Understanding of all instructions verbalized.

## 2021-04-26 NOTE — ED PROVIDER NOTE - CARE PROVIDER_API CALL
Osmel Sharif)  Surgery  [address] [place] [address] [id number]  Phone: [phone]  Fax: [phone]  Follow Up Time: 1-3 Days

## 2021-04-26 NOTE — ED ADULT NURSE NOTE - OBJECTIVE STATEMENT
Patient A/Ox4 ambulates with a steady gait.  at bedside. C/o sharp, constant RUQ ABD pain s/p lap edgar on 04/21. Pain is worse with movement and ambulation per patient. 3 surgical sites noted to ABD WNL. Patient also endorses lack of appetite, bloating, nausea and diarrhea.

## 2021-04-26 NOTE — ED PROVIDER NOTE - OBJECTIVE STATEMENT
pt c/o abd pain s/p lap choley on 4/21. pt seen in er for same on 4/24, had labs and ct, was cleared by surg, but pt continuing, so called surgeon, referred to er. pt relates passing gas, having diarrhea.  + nausea, + loose stools. no fevers, chills, vomiting, cp, sob, dysuria, hematuria, freq. pt declining pain meds or nausea meds.  pmd - jimmie  surg - robert

## 2021-04-27 LAB
CULTURE RESULTS: SIGNIFICANT CHANGE UP
SPECIMEN SOURCE: SIGNIFICANT CHANGE UP

## 2021-04-29 ENCOUNTER — APPOINTMENT (OUTPATIENT)
Dept: SURGERY | Facility: CLINIC | Age: 51
End: 2021-04-29
Payer: COMMERCIAL

## 2021-04-29 VITALS — TEMPERATURE: 98.7 F

## 2021-04-29 PROCEDURE — 99024 POSTOP FOLLOW-UP VISIT: CPT

## 2021-04-29 NOTE — HISTORY OF PRESENT ILLNESS
[de-identified] : s/p laparoscopic cholecystectomy [de-identified] : Pt with severe postop pain, seen in the ER X 2. Had a full workup including ct scan, sonogram and HIDA scan and labs that were all negative. Pt reports that she is feeling better. She is tolerating a low fat diet, Denies fevers or chills and says that her pain is decreasing.

## 2021-04-29 NOTE — PHYSICAL EXAM
[Normal Breath Sounds] : Normal breath sounds [Normal Heart Sounds] : normal heart sounds [Normal Rate and Rhythm] : normal rate and rhythm [Calm] : calm [de-identified] : well developed white female in no acute distress [de-identified] : nonicteric [de-identified] : normal bowel sounds, without distension, without guarding or rebound tenderness. Incisions clean and closed [de-identified] : without calf pain or swelling

## 2021-05-13 ENCOUNTER — APPOINTMENT (OUTPATIENT)
Dept: SURGERY | Facility: CLINIC | Age: 51
End: 2021-05-13
Payer: COMMERCIAL

## 2021-05-13 VITALS — TEMPERATURE: 97.5 F

## 2021-05-13 DIAGNOSIS — R10.11 RIGHT UPPER QUADRANT PAIN: ICD-10-CM

## 2021-05-13 PROCEDURE — 99024 POSTOP FOLLOW-UP VISIT: CPT

## 2021-05-13 NOTE — PHYSICAL EXAM
[Normal Breath Sounds] : Normal breath sounds [Normal Heart Sounds] : normal heart sounds [Normal Rate and Rhythm] : normal rate and rhythm [Calm] : calm [de-identified] : well developed white female in no distress [de-identified] : noicteric [de-identified] : normal bowel sounds, without distension or tenderness\par Incsisions clean and closed [de-identified] : without calf pain or swelling

## 2021-05-13 NOTE — HISTORY OF PRESENT ILLNESS
[de-identified] : s/p laparoscopic cholecystectomy [de-identified] : pt without complaints, normal GI functioning, denies any pain, without fevers or chills

## 2022-01-03 NOTE — ED ADULT NURSE NOTE - EXTENSIONS OF SELF_ADULT
SUBJECTIVE / OVERNIGHT EVENTS: pt seen and examined    MEDICATIONS  (STANDING):  benzonatate 100 milliGRAM(s) Oral every 8 hours  budesonide 160 MICROgram(s)/formoterol 4.5 MICROgram(s) Inhaler 2 Puff(s) Inhalation two times a day  buMETAnide 2 milliGRAM(s) Oral daily  ferrous    sulfate 325 milliGRAM(s) Oral daily  heparin   Injectable 5000 Unit(s) SubCutaneous every 8 hours  hydrALAZINE 25 milliGRAM(s) Oral every 8 hours  lidocaine   4% Patch 1 Patch Transdermal every 24 hours  methylPREDNISolone sodium succinate Injectable 40 milliGRAM(s) IV Push every 8 hours  metoprolol succinate ER 25 milliGRAM(s) Oral daily  pantoprazole    Tablet 40 milliGRAM(s) Oral before breakfast  simvastatin 10 milliGRAM(s) Oral at bedtime    MEDICATIONS  (PRN):  acetaminophen     Tablet .. 650 milliGRAM(s) Oral every 6 hours PRN Moderate Pain (4 - 6)  ALBUTerol    90 MICROgram(s) HFA Inhaler 2 Puff(s) Inhalation every 6 hours PRN Shortness of Breath and/or Wheezing  aluminum hydroxide/magnesium hydroxide/simethicone Suspension 30 milliLiter(s) Oral every 4 hours PRN Dyspepsia  oxymetazoline 0.05% Nasal Spray 2 Spray(s) Nasal every 12 hours PRN Epistaxis  sodium chloride 0.65% Nasal 1 Spray(s) Both Nostrils every 8 hours PRN Nasal Congestion    Vital Signs Last 24 Hrs  T(C): 36.7 (01-03-22 @ 13:40), Max: 36.8 (01-03-22 @ 05:58)  T(F): 98.1 (01-03-22 @ 13:40), Max: 98.2 (01-03-22 @ 05:58)  HR: 68 (01-03-22 @ 13:40) (68 - 72)  BP: 126/69 (01-03-22 @ 13:40) (126/69 - 156/78)  BP(mean): --  RR: 18 (01-03-22 @ 13:40) (18 - 18)  SpO2: 98% (01-03-22 @ 13:40) (97% - 99%)          Constitutional: No fever, fatigue  Skin: No rash.  Eyes: No recent vision problems or eye pain.  ENT: No congestion, ear pain, or sore throat.  Cardiovascular: No chest pain or palpation.  Respiratory: No cough, shortness of breath, congestion, or wheezing.  Gastrointestinal: No abdominal pain, nausea, vomiting, or diarrhea.  Genitourinary: No dysuria.  Musculoskeletal: No joint swelling.  Neurologic: No headache.    PHYSICAL EXAM:  GENERAL: NAD  EYES: EOMI, PERRLA  NECK: Supple, No JVD  CHEST/LUNG: dec breath sounds at bases, fine exp wheezing+  HEART:  S1 , S2 +  ABDOMEN: soft , bs+  EXTREMITIES: + edema   NEUROLOGY:alert awake    LABS:  01-03    136  |  96<L>  |  80<H>  ----------------------------<  171<H>  4.9   |  29  |  1.82<H>    Ca    9.8      03 Jan 2022 07:21  Phos  4.0     01-03  Mg     2.20     01-03      Creatinine Trend: 1.82 <--, 1.83 <--, 1.86 <--, 1.80 <--, 1.79 <--, 1.75 <--                        8.8    7.80  )-----------( 353      ( 03 Jan 2022 07:21 )             31.5     Urine Studies:                      Creatinine, Random Urine: 153 mg/dL (12-26 @ 16:17)                                   None

## 2022-01-25 ENCOUNTER — APPOINTMENT (OUTPATIENT)
Dept: PEDIATRIC ALLERGY IMMUNOLOGY | Facility: CLINIC | Age: 52
End: 2022-01-25

## 2022-08-26 NOTE — ED ADULT NURSE NOTE - ALCOHOL PRE SCREEN (AUDIT - C)
Quality 137: Melanoma: Continuity Of Care - Recall System: Patient information entered into a recall system that includes: target date for the next exam specified AND a process to follow up with patients regarding missed or unscheduled appointments Detail Level: Detailed Statement Selected

## 2022-12-02 NOTE — H&P PST ADULT - HEART RATE (BEATS/MIN)
----- Message from Georgette Anders, Patient Care Assistant sent at 12/2/2022  9:15 AM CST -----  Regarding: appointment  Contact: pt  Type: Needs Medical Advice  Who Called:  pt   Best Call Back Number: [phone] (home)     Additional Information: pt states she like a callback regarding being currently at the lab for an unknown swab test. Please call pt to advise. Thanks!         97

## 2023-05-06 NOTE — H&P PST ADULT - GASTROINTESTINAL COMMENTS
Patient resting comfortably at this time  Will continue to monitor        Shannon Reed RN  05/06/23 [id number]
see HPI

## 2023-08-12 ENCOUNTER — EMERGENCY (EMERGENCY)
Facility: HOSPITAL | Age: 53
LOS: 1 days | Discharge: HOME | End: 2023-08-12
Attending: EMERGENCY MEDICINE | Admitting: EMERGENCY MEDICINE
Payer: COMMERCIAL

## 2023-08-12 VITALS
RESPIRATION RATE: 17 BRPM | OXYGEN SATURATION: 100 % | TEMPERATURE: 98 F | DIASTOLIC BLOOD PRESSURE: 75 MMHG | SYSTOLIC BLOOD PRESSURE: 156 MMHG | HEART RATE: 88 BPM | WEIGHT: 138.01 LBS

## 2023-08-12 DIAGNOSIS — Z98.89 OTHER SPECIFIED POSTPROCEDURAL STATES: Chronic | ICD-10-CM

## 2023-08-12 DIAGNOSIS — Z90.49 ACQUIRED ABSENCE OF OTHER SPECIFIED PARTS OF DIGESTIVE TRACT: Chronic | ICD-10-CM

## 2023-08-12 LAB
ALBUMIN SERPL ELPH-MCNC: 3.9 G/DL — SIGNIFICANT CHANGE UP (ref 3.3–5)
ALP SERPL-CCNC: 91 U/L — SIGNIFICANT CHANGE UP (ref 40–120)
ALT FLD-CCNC: 31 U/L — SIGNIFICANT CHANGE UP (ref 12–78)
ANION GAP SERPL CALC-SCNC: 7 MMOL/L — SIGNIFICANT CHANGE UP (ref 5–17)
APPEARANCE UR: CLEAR — SIGNIFICANT CHANGE UP
AST SERPL-CCNC: 21 U/L — SIGNIFICANT CHANGE UP (ref 15–37)
BASOPHILS # BLD AUTO: 0.04 K/UL — SIGNIFICANT CHANGE UP (ref 0–0.2)
BASOPHILS NFR BLD AUTO: 0.6 % — SIGNIFICANT CHANGE UP (ref 0–2)
BILIRUB SERPL-MCNC: 0.8 MG/DL — SIGNIFICANT CHANGE UP (ref 0.2–1.2)
BILIRUB UR-MCNC: NEGATIVE — SIGNIFICANT CHANGE UP
BUN SERPL-MCNC: 14 MG/DL — SIGNIFICANT CHANGE UP (ref 7–23)
CALCIUM SERPL-MCNC: 9.7 MG/DL — SIGNIFICANT CHANGE UP (ref 8.5–10.1)
CHLORIDE SERPL-SCNC: 106 MMOL/L — SIGNIFICANT CHANGE UP (ref 96–108)
CO2 SERPL-SCNC: 26 MMOL/L — SIGNIFICANT CHANGE UP (ref 22–31)
COLOR SPEC: YELLOW — SIGNIFICANT CHANGE UP
CREAT SERPL-MCNC: 0.91 MG/DL — SIGNIFICANT CHANGE UP (ref 0.5–1.3)
DIFF PNL FLD: NEGATIVE — SIGNIFICANT CHANGE UP
EGFR: 74 ML/MIN/1.73M2 — SIGNIFICANT CHANGE UP
EOSINOPHIL # BLD AUTO: 0.18 K/UL — SIGNIFICANT CHANGE UP (ref 0–0.5)
EOSINOPHIL NFR BLD AUTO: 2.8 % — SIGNIFICANT CHANGE UP (ref 0–6)
GLUCOSE SERPL-MCNC: 121 MG/DL — HIGH (ref 70–99)
GLUCOSE UR QL: NEGATIVE MG/DL — SIGNIFICANT CHANGE UP
HCT VFR BLD CALC: 42.5 % — SIGNIFICANT CHANGE UP (ref 34.5–45)
HGB BLD-MCNC: 14 G/DL — SIGNIFICANT CHANGE UP (ref 11.5–15.5)
IMM GRANULOCYTES NFR BLD AUTO: 0.3 % — SIGNIFICANT CHANGE UP (ref 0–0.9)
KETONES UR-MCNC: NEGATIVE MG/DL — SIGNIFICANT CHANGE UP
LEUKOCYTE ESTERASE UR-ACNC: ABNORMAL
LIDOCAIN IGE QN: 131 U/L — SIGNIFICANT CHANGE UP (ref 73–393)
LYMPHOCYTES # BLD AUTO: 2.04 K/UL — SIGNIFICANT CHANGE UP (ref 1–3.3)
LYMPHOCYTES # BLD AUTO: 31.3 % — SIGNIFICANT CHANGE UP (ref 13–44)
MCHC RBC-ENTMCNC: 29.2 PG — SIGNIFICANT CHANGE UP (ref 27–34)
MCHC RBC-ENTMCNC: 32.9 GM/DL — SIGNIFICANT CHANGE UP (ref 32–36)
MCV RBC AUTO: 88.5 FL — SIGNIFICANT CHANGE UP (ref 80–100)
MONOCYTES # BLD AUTO: 0.47 K/UL — SIGNIFICANT CHANGE UP (ref 0–0.9)
MONOCYTES NFR BLD AUTO: 7.2 % — SIGNIFICANT CHANGE UP (ref 2–14)
NEUTROPHILS # BLD AUTO: 3.76 K/UL — SIGNIFICANT CHANGE UP (ref 1.8–7.4)
NEUTROPHILS NFR BLD AUTO: 57.8 % — SIGNIFICANT CHANGE UP (ref 43–77)
NITRITE UR-MCNC: NEGATIVE — SIGNIFICANT CHANGE UP
NRBC # BLD: 0 /100 WBCS — SIGNIFICANT CHANGE UP (ref 0–0)
PH UR: 6.5 — SIGNIFICANT CHANGE UP (ref 5–8)
PLATELET # BLD AUTO: 280 K/UL — SIGNIFICANT CHANGE UP (ref 150–400)
POTASSIUM SERPL-MCNC: 3.9 MMOL/L — SIGNIFICANT CHANGE UP (ref 3.5–5.3)
POTASSIUM SERPL-SCNC: 3.9 MMOL/L — SIGNIFICANT CHANGE UP (ref 3.5–5.3)
PROT SERPL-MCNC: 7.1 G/DL — SIGNIFICANT CHANGE UP (ref 6–8.3)
PROT UR-MCNC: NEGATIVE MG/DL — SIGNIFICANT CHANGE UP
RBC # BLD: 4.8 M/UL — SIGNIFICANT CHANGE UP (ref 3.8–5.2)
RBC # FLD: 12.4 % — SIGNIFICANT CHANGE UP (ref 10.3–14.5)
SODIUM SERPL-SCNC: 139 MMOL/L — SIGNIFICANT CHANGE UP (ref 135–145)
SP GR SPEC: 1.02 — SIGNIFICANT CHANGE UP (ref 1–1.03)
UROBILINOGEN FLD QL: 1 MG/DL — SIGNIFICANT CHANGE UP (ref 0.2–1)
WBC # BLD: 6.51 K/UL — SIGNIFICANT CHANGE UP (ref 3.8–10.5)
WBC # FLD AUTO: 6.51 K/UL — SIGNIFICANT CHANGE UP (ref 3.8–10.5)

## 2023-08-12 PROCEDURE — 74176 CT ABD & PELVIS W/O CONTRAST: CPT | Mod: 26,MA

## 2023-08-12 PROCEDURE — 99284 EMERGENCY DEPT VISIT MOD MDM: CPT | Mod: 25

## 2023-08-12 PROCEDURE — 96374 THER/PROPH/DIAG INJ IV PUSH: CPT

## 2023-08-12 PROCEDURE — 74176 CT ABD & PELVIS W/O CONTRAST: CPT | Mod: MA

## 2023-08-12 PROCEDURE — 81001 URINALYSIS AUTO W/SCOPE: CPT

## 2023-08-12 PROCEDURE — 85025 COMPLETE CBC W/AUTO DIFF WBC: CPT

## 2023-08-12 PROCEDURE — 83690 ASSAY OF LIPASE: CPT

## 2023-08-12 PROCEDURE — 36415 COLL VENOUS BLD VENIPUNCTURE: CPT

## 2023-08-12 PROCEDURE — 96375 TX/PRO/DX INJ NEW DRUG ADDON: CPT

## 2023-08-12 PROCEDURE — 87086 URINE CULTURE/COLONY COUNT: CPT

## 2023-08-12 PROCEDURE — 96376 TX/PRO/DX INJ SAME DRUG ADON: CPT

## 2023-08-12 PROCEDURE — 80053 COMPREHEN METABOLIC PANEL: CPT

## 2023-08-12 PROCEDURE — 99285 EMERGENCY DEPT VISIT HI MDM: CPT

## 2023-08-12 RX ORDER — KETOROLAC TROMETHAMINE 30 MG/ML
15 SYRINGE (ML) INJECTION ONCE
Refills: 0 | Status: DISCONTINUED | OUTPATIENT
Start: 2023-08-12 | End: 2023-08-12

## 2023-08-12 RX ORDER — OXYCODONE HYDROCHLORIDE 5 MG/1
1 TABLET ORAL
Qty: 18 | Refills: 0
Start: 2023-08-12 | End: 2023-08-14

## 2023-08-12 RX ORDER — SODIUM CHLORIDE 9 MG/ML
1000 INJECTION INTRAMUSCULAR; INTRAVENOUS; SUBCUTANEOUS ONCE
Refills: 0 | Status: COMPLETED | OUTPATIENT
Start: 2023-08-12 | End: 2023-08-12

## 2023-08-12 RX ORDER — CIPROFLOXACIN LACTATE 400MG/40ML
500 VIAL (ML) INTRAVENOUS ONCE
Refills: 0 | Status: COMPLETED | OUTPATIENT
Start: 2023-08-12 | End: 2023-08-12

## 2023-08-12 RX ORDER — CYCLOBENZAPRINE HYDROCHLORIDE 10 MG/1
1 TABLET, FILM COATED ORAL
Qty: 15 | Refills: 0
Start: 2023-08-12 | End: 2023-08-16

## 2023-08-12 RX ORDER — ACETAMINOPHEN 500 MG
1000 TABLET ORAL ONCE
Refills: 0 | Status: COMPLETED | OUTPATIENT
Start: 2023-08-12 | End: 2023-08-12

## 2023-08-12 RX ORDER — MORPHINE SULFATE 50 MG/1
4 CAPSULE, EXTENDED RELEASE ORAL ONCE
Refills: 0 | Status: DISCONTINUED | OUTPATIENT
Start: 2023-08-12 | End: 2023-08-12

## 2023-08-12 RX ORDER — LIDOCAINE 4 G/100G
1 CREAM TOPICAL ONCE
Refills: 0 | Status: COMPLETED | OUTPATIENT
Start: 2023-08-12 | End: 2023-08-12

## 2023-08-12 RX ORDER — ONDANSETRON 8 MG/1
4 TABLET, FILM COATED ORAL ONCE
Refills: 0 | Status: COMPLETED | OUTPATIENT
Start: 2023-08-12 | End: 2023-08-12

## 2023-08-12 RX ORDER — CIPROFLOXACIN LACTATE 400MG/40ML
1 VIAL (ML) INTRAVENOUS
Qty: 10 | Refills: 0
Start: 2023-08-12 | End: 2023-08-16

## 2023-08-12 RX ORDER — OXYCODONE HYDROCHLORIDE 5 MG/1
5 TABLET ORAL ONCE
Refills: 0 | Status: DISCONTINUED | OUTPATIENT
Start: 2023-08-12 | End: 2023-08-12

## 2023-08-12 RX ORDER — CYCLOBENZAPRINE HYDROCHLORIDE 10 MG/1
10 TABLET, FILM COATED ORAL ONCE
Refills: 0 | Status: COMPLETED | OUTPATIENT
Start: 2023-08-12 | End: 2023-08-12

## 2023-08-12 RX ADMIN — Medication 400 MILLIGRAM(S): at 20:55

## 2023-08-12 RX ADMIN — MORPHINE SULFATE 4 MILLIGRAM(S): 50 CAPSULE, EXTENDED RELEASE ORAL at 21:34

## 2023-08-12 RX ADMIN — MORPHINE SULFATE 4 MILLIGRAM(S): 50 CAPSULE, EXTENDED RELEASE ORAL at 22:33

## 2023-08-12 RX ADMIN — OXYCODONE HYDROCHLORIDE 5 MILLIGRAM(S): 5 TABLET ORAL at 23:17

## 2023-08-12 RX ADMIN — Medication 500 MILLIGRAM(S): at 23:16

## 2023-08-12 RX ADMIN — ONDANSETRON 4 MILLIGRAM(S): 8 TABLET, FILM COATED ORAL at 21:35

## 2023-08-12 RX ADMIN — Medication 15 MILLIGRAM(S): at 23:17

## 2023-08-12 RX ADMIN — CYCLOBENZAPRINE HYDROCHLORIDE 10 MILLIGRAM(S): 10 TABLET, FILM COATED ORAL at 23:17

## 2023-08-12 RX ADMIN — SODIUM CHLORIDE 1000 MILLILITER(S): 9 INJECTION INTRAMUSCULAR; INTRAVENOUS; SUBCUTANEOUS at 20:55

## 2023-08-12 RX ADMIN — LIDOCAINE 1 PATCH: 4 CREAM TOPICAL at 23:16

## 2023-08-12 NOTE — ED PROVIDER NOTE - OBJECTIVE STATEMENT
Patient is a 53-year-old female past medical history of hyperlipidemia coming coming in complaining of right flank pain restarted this morning before getting progressively worse.  Patient states patient initially felt like a spasm worse with movement.  Patient went to urgent care advised to come to emergency room to rule out stone.  Patient denies any trauma heavy lifting.  Patient states pain is worse with movement.  Patient denies any nausea vomiting urinary symptoms dysuria no history of kidney stones in the past.  Patient was given Toradol at facility which did help the pain. Pt states she has been placing heating pad to area as well.
98

## 2023-08-12 NOTE — ED PROVIDER NOTE - NSDCPRINTRESULTS_ED_ALL_ED
Have Your Spot(S) Been Treated In The Past?: has not been treated
Hpi Title: Evaluation of a Skin Lesion
Patient requests all Lab, Cardiology, and Radiology Results on their Discharge Instructions

## 2023-08-12 NOTE — ED PROVIDER NOTE - PATIENT PORTAL LINK FT
You can access the FollowMyHealth Patient Portal offered by [facility] by registering at the following website: [email or web address]. By joining [facility]’s FollowMyHealth portal, you will also be able to view your health information using other applications (apps) compatible with our system.

## 2023-08-12 NOTE — ED ADULT TRIAGE NOTE - NS ED TRIAGE CLINICAL UPGRADE
Pain - Patient was clinically upgraded due to pain. Griseofulvin Pregnancy And Lactation Text: This medication is Pregnancy Category X and is known to cause serious birth defects. It is unknown if this medication is excreted in breast milk but breast feeding should be avoided.

## 2023-08-12 NOTE — ED PROVIDER NOTE - CLINICAL SUMMARY MEDICAL DECISION MAKING FREE TEXT BOX
53-year-old female presents to the ER sent by urgent care for evaluation of right flank pain, patient denies any fever no vomiting, on examination noted slight redness over the right flank area patient states that she has been using a heating pad all day, abdomen soft and nontender, patient pain worsens with certain movements, follow-up CT renal stone, send CBC, CMP, ct renal stone hunt, pain control, re-eval.

## 2023-08-12 NOTE — ED PROVIDER NOTE - NSFOLLOWUPINSTRUCTIONS_ED_ALL_ED_FT
Follow-up with your primary care doctor, call on Monday to make an appointment.  Take Cipro antibiotic as prescribed.  Take oxycodone and Flexeril for your flank pain.  Do not drive while taking these medications.  May use over-the-counter lidocaine pain patches as needed.  Return to the emergency department if having fever greater than 100.3 °F, prolonged vomiting, and or worsening of symptoms.

## 2023-08-14 LAB
CULTURE RESULTS: SIGNIFICANT CHANGE UP
SPECIMEN SOURCE: SIGNIFICANT CHANGE UP

## 2023-08-22 NOTE — ASU PREOP CHECKLIST - ALLERGY BAND ON
done Clindamycin Pregnancy And Lactation Text: This medication can be used in pregnancy if certain situations. Clindamycin is also present in breast milk.

## 2023-11-14 ENCOUNTER — NON-APPOINTMENT (OUTPATIENT)
Age: 53
End: 2023-11-14

## 2023-11-14 ENCOUNTER — APPOINTMENT (OUTPATIENT)
Dept: OTOLARYNGOLOGY | Facility: CLINIC | Age: 53
End: 2023-11-14
Payer: COMMERCIAL

## 2023-11-14 VITALS — WEIGHT: 138 LBS | HEIGHT: 61.5 IN | BODY MASS INDEX: 25.72 KG/M2

## 2023-11-14 VITALS — DIASTOLIC BLOOD PRESSURE: 84 MMHG | HEART RATE: 91 BPM | SYSTOLIC BLOOD PRESSURE: 143 MMHG

## 2023-11-14 DIAGNOSIS — J31.0 CHRONIC RHINITIS: ICD-10-CM

## 2023-11-14 DIAGNOSIS — H61.23 IMPACTED CERUMEN, BILATERAL: ICD-10-CM

## 2023-11-14 DIAGNOSIS — J34.89 OTHER SPECIFIED DISORDERS OF NOSE AND NASAL SINUSES: ICD-10-CM

## 2023-11-14 DIAGNOSIS — J34.2 DEVIATED NASAL SEPTUM: ICD-10-CM

## 2023-11-14 DIAGNOSIS — H93.8X3 OTHER SPECIFIED DISORDERS OF EAR, BILATERAL: ICD-10-CM

## 2023-11-14 PROCEDURE — 31231 NASAL ENDOSCOPY DX: CPT

## 2023-11-14 PROCEDURE — 99203 OFFICE O/P NEW LOW 30 MIN: CPT | Mod: 25

## 2023-11-14 RX ORDER — CETIRIZINE HCL 10 MG
TABLET ORAL
Refills: 0 | Status: ACTIVE | COMMUNITY

## 2023-12-29 NOTE — ED PROVIDER NOTE - NSTIMEPROVIDERCAREINITIATE_GEN_ER
[facility]  [address] [place] [address] [address] [address]  Dept: [phone]  Dept Fax: [phone]    Jeanie Sal is a 66 y.o. male who presents to the urgent care today for his medical conditions/complaints as notedbelow. Jeanie Sal is c/o of Cough (Onset since Sunday with back pain, irritation throat and low grade fever(on and off) )      HPI:     66 yr old female presents for uri sx 5 days, occasional low back ache, occasional cough productive yellow mucous. Hx emphysema, feels well, concerned due to colored mucous  Exposed to strep throat    Cough  This is a new problem. The current episode started in the past 7 days (5d). The problem has been unchanged. The problem occurs every few hours. The cough is Productive of purulent sputum. Associated symptoms include chills, a fever (intermittent low grade - up to 100), postnasal drip and a sore throat (scratchy). Pertinent negatives include no chest pain, ear congestion, ear pain, headaches, hemoptysis, myalgias, nasal congestion, rash, rhinorrhea, shortness of breath, sweats, weight loss or wheezing. The symptoms are aggravated by lying down. Treatments tried: tylenol. The treatment provided mild relief. His past medical history is significant for bronchitis, emphysema and environmental allergies.        Past Medical History:   Diagnosis Date    Arrhythmia     irregular heart beats    Arthritis     COVID 02/2022    Emphysema     Hearing loss     rt ear    Kidney stone     Neck pain     Numbness and tingling in left arm     Quadriplegia, C1-C4 incomplete ([address]) 04/01/2014    resolved    Wears glasses     reading        Current Outpatient Medications   Medication Sig Dispense Refill    azithromycin (ZITHROMAX) 250 MG tablet Take 1 tablet by mouth See Admin Instructions for 5 days 500mg on day 1 followed by 250mg on days 2 - 5 6 tablet 0    fluticasone (FLONASE) 50
24-Apr-2021 00:20

## 2024-02-27 ENCOUNTER — APPOINTMENT (OUTPATIENT)
Dept: SURGERY | Facility: CLINIC | Age: 54
End: 2024-02-27
Payer: MEDICAID

## 2024-02-27 VITALS
BODY MASS INDEX: 25.72 KG/M2 | HEIGHT: 61.5 IN | WEIGHT: 138 LBS | DIASTOLIC BLOOD PRESSURE: 93 MMHG | SYSTOLIC BLOOD PRESSURE: 154 MMHG

## 2024-02-27 DIAGNOSIS — M85.80 OTHER SPECIFIED DISORDERS OF BONE DENSITY AND STRUCTURE, UNSPECIFIED SITE: ICD-10-CM

## 2024-02-27 DIAGNOSIS — M79.7 FIBROMYALGIA: ICD-10-CM

## 2024-02-27 PROCEDURE — 99204 OFFICE O/P NEW MOD 45 MIN: CPT | Mod: 25

## 2024-02-28 PROBLEM — M79.7 FIBROMYALGIA: Status: ACTIVE | Noted: 2024-02-28

## 2024-02-28 NOTE — CONSULT LETTER
[Dear  ___] : Dear  [unfilled], [Consult Letter:] : I had the pleasure of evaluating your patient, [unfilled]. [Please see my note below.] : Please see my note below. [Sincerely,] : Sincerely, [Consult Closing:] : Thank you very much for allowing me to participate in the care of this patient.  If you have any questions, please do not hesitate to contact me. [FreeTextEntry2] : Dr. Pancho Luna [FreeTextEntry3] : Kiana Lebron MD, FACS Assistant Professor of Surgery and Otolaryngology [facility] [DrRaul  ___] : Dr. MASON

## 2024-02-28 NOTE — HISTORY OF PRESENT ILLNESS
[de-identified] : Patient referred by Dr. Luna for evaluation of suspicious thyroid nodule and primary hyperparathyroidism.  1 year ago patient noted to have elevated calcium with history of IBS and osteopenia.  Patient denies prior history of thyroid disease, dysphagia, change in voice, kidney stones, radiation exposure, hypertension, fatigue or bone pain.  Blood work September 2023: Calcium 10.8, PTH 91, creatinine 0.8.  Thyroid ultrasound January 2024: Right lobe 4 x 1.4 x 1.2 cm, no nodules noted.  Left lobe 5.3 x 2.8 x 2.3 cm with lower nodule 3.5 x 2.5 x 2.8 cm.  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 I have reviewed all old and new data and available images.  Additional information was obtained from others present at the time of the visit to ensure the completeness of the history.

## 2024-02-28 NOTE — ASSESSMENT
[FreeTextEntry1] : Patient with primary hyperparathyroidism and recently noted suspicious left thyroid nodule.  I have recommended a left thyroid lobectomy and parathyroidectomy.  I have requested a sestamibi scan for preoperative localization since patient has anaphylaxis to IV contrast. I have reviewed the pathophysiology of the disease process, the area anatomy and the rationale for surgery.  I discussed the risks, benefits and alternative treatments which include but are not limited to bleeding, infection, numbness, hoarseness, hypocalcemia, scarring, and need for reoperation.  I have answered the patient's questions to their satisfaction.  The patient wishes to proceed with the recommended procedure.  They will contact my office to schedule surgery.

## 2024-02-28 NOTE — REASON FOR VISIT
[FreeTextEntry2] : Follicular neoplasm of the thyroid and primary hyperparathyroidism [Initial Consultation] : an initial consultation for [Friend] : friend

## 2024-02-28 NOTE — PHYSICAL EXAM
[de-identified] : no cervical or supraclavicular adenopathy, trachea midline, thyroid without enlargement left lower 3.5 cm palpable mass [Normal] : no neck adenopathy [de-identified] : Skin:  normal appearance.  no rash, nodules, vesicles, or erythema, Musculoskeletal:  full range of motion and no deformities appreciated Neurological:  grossly intact Psychiatric:  oriented to person, place and time with appropriate affect

## 2024-03-01 ENCOUNTER — NON-APPOINTMENT (OUTPATIENT)
Age: 54
End: 2024-03-01

## 2024-03-04 LAB
25(OH)D3 SERPL-MCNC: 31.8 NG/ML
CALCIUM SERPL-MCNC: 10.4 MG/DL
CALCIUM SERPL-MCNC: 10.4 MG/DL
PARATHYROID HORMONE INTACT: 76 PG/ML

## 2024-03-06 ENCOUNTER — OUTPATIENT (OUTPATIENT)
Dept: OUTPATIENT SERVICES | Facility: HOSPITAL | Age: 54
LOS: 1 days | End: 2024-03-06
Payer: MEDICAID

## 2024-03-06 ENCOUNTER — APPOINTMENT (OUTPATIENT)
Dept: NUCLEAR MEDICINE | Facility: IMAGING CENTER | Age: 54
End: 2024-03-06
Payer: MEDICAID

## 2024-03-06 DIAGNOSIS — Z98.89 OTHER SPECIFIED POSTPROCEDURAL STATES: Chronic | ICD-10-CM

## 2024-03-06 DIAGNOSIS — Z90.49 ACQUIRED ABSENCE OF OTHER SPECIFIED PARTS OF DIGESTIVE TRACT: Chronic | ICD-10-CM

## 2024-03-06 DIAGNOSIS — D49.7 NEOPLASM OF UNSPECIFIED BEHAVIOR OF ENDOCRINE GLANDS AND OTHER PARTS OF NERVOUS SYSTEM: ICD-10-CM

## 2024-03-06 PROCEDURE — 78072 PARATHYRD PLANAR W/SPECT&CT: CPT | Mod: 26

## 2024-03-06 PROCEDURE — A9512: CPT

## 2024-03-06 PROCEDURE — A9500: CPT

## 2024-03-06 PROCEDURE — 78072 PARATHYRD PLANAR W/SPECT&CT: CPT

## 2024-03-13 ENCOUNTER — APPOINTMENT (OUTPATIENT)
Dept: SURGERY | Facility: CLINIC | Age: 54
End: 2024-03-13

## 2024-03-14 ENCOUNTER — NON-APPOINTMENT (OUTPATIENT)
Age: 54
End: 2024-03-14

## 2024-04-10 ENCOUNTER — OUTPATIENT (OUTPATIENT)
Dept: OUTPATIENT SERVICES | Facility: HOSPITAL | Age: 54
LOS: 1 days | End: 2024-04-10

## 2024-04-10 VITALS
RESPIRATION RATE: 16 BRPM | OXYGEN SATURATION: 96 % | DIASTOLIC BLOOD PRESSURE: 71 MMHG | WEIGHT: 139.99 LBS | HEIGHT: 60.5 IN | TEMPERATURE: 97 F | SYSTOLIC BLOOD PRESSURE: 116 MMHG | HEART RATE: 92 BPM

## 2024-04-10 DIAGNOSIS — C43.9 MALIGNANT MELANOMA OF SKIN, UNSPECIFIED: Chronic | ICD-10-CM

## 2024-04-10 DIAGNOSIS — Z98.89 OTHER SPECIFIED POSTPROCEDURAL STATES: Chronic | ICD-10-CM

## 2024-04-10 DIAGNOSIS — E21.0 PRIMARY HYPERPARATHYROIDISM: ICD-10-CM

## 2024-04-10 DIAGNOSIS — J45.909 UNSPECIFIED ASTHMA, UNCOMPLICATED: ICD-10-CM

## 2024-04-10 DIAGNOSIS — E21.3 HYPERPARATHYROIDISM, UNSPECIFIED: ICD-10-CM

## 2024-04-10 DIAGNOSIS — Z90.49 ACQUIRED ABSENCE OF OTHER SPECIFIED PARTS OF DIGESTIVE TRACT: Chronic | ICD-10-CM

## 2024-04-10 LAB
ALBUMIN SERPL ELPH-MCNC: 4.7 G/DL — SIGNIFICANT CHANGE UP (ref 3.3–5)
ALP SERPL-CCNC: 96 U/L — SIGNIFICANT CHANGE UP (ref 40–120)
ALT FLD-CCNC: 22 U/L — SIGNIFICANT CHANGE UP (ref 4–33)
ANION GAP SERPL CALC-SCNC: 11 MMOL/L — SIGNIFICANT CHANGE UP (ref 7–14)
AST SERPL-CCNC: 20 U/L — SIGNIFICANT CHANGE UP (ref 4–32)
BILIRUB SERPL-MCNC: 0.6 MG/DL — SIGNIFICANT CHANGE UP (ref 0.2–1.2)
BUN SERPL-MCNC: 14 MG/DL — SIGNIFICANT CHANGE UP (ref 7–23)
CALCIUM SERPL-MCNC: 10.1 MG/DL — SIGNIFICANT CHANGE UP (ref 8.4–10.5)
CHLORIDE SERPL-SCNC: 102 MMOL/L — SIGNIFICANT CHANGE UP (ref 98–107)
CO2 SERPL-SCNC: 26 MMOL/L — SIGNIFICANT CHANGE UP (ref 22–31)
CREAT SERPL-MCNC: 0.77 MG/DL — SIGNIFICANT CHANGE UP (ref 0.5–1.3)
EGFR: 90 ML/MIN/1.73M2 — SIGNIFICANT CHANGE UP
GLUCOSE SERPL-MCNC: 87 MG/DL — SIGNIFICANT CHANGE UP (ref 70–99)
HCT VFR BLD CALC: 43.1 % — SIGNIFICANT CHANGE UP (ref 34.5–45)
HGB BLD-MCNC: 14.2 G/DL — SIGNIFICANT CHANGE UP (ref 11.5–15.5)
MCHC RBC-ENTMCNC: 28.5 PG — SIGNIFICANT CHANGE UP (ref 27–34)
MCHC RBC-ENTMCNC: 32.9 GM/DL — SIGNIFICANT CHANGE UP (ref 32–36)
MCV RBC AUTO: 86.4 FL — SIGNIFICANT CHANGE UP (ref 80–100)
NRBC # BLD: 0 /100 WBCS — SIGNIFICANT CHANGE UP (ref 0–0)
NRBC # FLD: 0 K/UL — SIGNIFICANT CHANGE UP (ref 0–0)
PLATELET # BLD AUTO: 333 K/UL — SIGNIFICANT CHANGE UP (ref 150–400)
POTASSIUM SERPL-MCNC: 4.1 MMOL/L — SIGNIFICANT CHANGE UP (ref 3.5–5.3)
POTASSIUM SERPL-SCNC: 4.1 MMOL/L — SIGNIFICANT CHANGE UP (ref 3.5–5.3)
PROT SERPL-MCNC: 7.1 G/DL — SIGNIFICANT CHANGE UP (ref 6–8.3)
RBC # BLD: 4.99 M/UL — SIGNIFICANT CHANGE UP (ref 3.8–5.2)
RBC # FLD: 12.9 % — SIGNIFICANT CHANGE UP (ref 10.3–14.5)
SODIUM SERPL-SCNC: 139 MMOL/L — SIGNIFICANT CHANGE UP (ref 135–145)
WBC # BLD: 7.09 K/UL — SIGNIFICANT CHANGE UP (ref 3.8–10.5)
WBC # FLD AUTO: 7.09 K/UL — SIGNIFICANT CHANGE UP (ref 3.8–10.5)

## 2024-04-10 NOTE — H&P PST ADULT - NSICDXPASTMEDICALHX_GEN_ALL_CORE_FT
PAST MEDICAL HISTORY:  Asthma (Only with seasonal allergies)    Fibromyalgia     History of hyperlipidemia     Hyperparathyroidism     Thyroid nodule     Tourettes disorder ("Mild tic")

## 2024-04-10 NOTE — H&P PST ADULT - ENDOCRINE COMMENTS
Hx hyperparathyroid with elevated Ca levels for several years - pt also noted to have thyroid nodule left - for removal

## 2024-04-10 NOTE — H&P PST ADULT - HISTORY OF PRESENT ILLNESS
This51 yo female with a PMHX of HLD, Mild Asthma, premature ovarian failure and Tourette's   presents to [facility] for a scheduled Laparoscopic Cholecystectomy with Dr Sharif  on 4/21/21 . She developed abdominal discomfort and bloating in 2/21 which "was not like my normal lactose intolerance and GI stuff". Her workup showed GB disfunction at 37%. She is easily nauseated with any food.  She denies any fever,chills, abdominal pain, changes in bowels today. Pt is electing to have this procedure.  Pt is a 54 yr old female scheduled for Parathyroidectomy with PTH Assay Left thyroid Lobectomy with Paratracheal Node Dissection and Closure with Dr Lebron tentatively 4/17/24 - pt with hx elevated Ca levels for several years and now imaging noted left thyroid lobe nodule - pt denies dysphagia or pain - pt hx controlled asthma, HLD, and recent superficial malignant melanoma left knee excision - hx Tourette's syndrome, anxiety , fibromyalgia

## 2024-04-10 NOTE — H&P PST ADULT - ATTENDING COMMENTS
patient for left thyroid lobectomy with paratracheal dissection and possible total with parathyroidectomy.

## 2024-04-10 NOTE — H&P PST ADULT - HISTORY OF COVID-19 VACCINATION
Patient last seen on 08/09/17 bp was (146/90) , and was to return in 2 days for blood pressure check and did not patient was seen in urgent care on 08/30/17 and bp was (153/96) .  Last refill done on 08/09/17 # 90 x 1 refill sent to [place], pharmacy [facility] asking for refill.   Script set to E prescribe pending doctor's approval.  Please advise.         
The preferred pharmacy is set up and verified...  
Yes

## 2024-04-10 NOTE — H&P PST ADULT - NSICDXPASTSURGICALHX_GEN_ALL_CORE_FT
PAST SURGICAL HISTORY:  History of arthroscopy of both knees right 20 yrs ago , left 2013    Malignant melanoma     S/P carpal tunnel release (Right, 2015 & Left 4/2016)    S/P cholecystectomy

## 2024-04-16 ENCOUNTER — TRANSCRIPTION ENCOUNTER (OUTPATIENT)
Age: 54
End: 2024-04-16

## 2024-04-17 ENCOUNTER — RESULT REVIEW (OUTPATIENT)
Age: 54
End: 2024-04-17

## 2024-04-17 ENCOUNTER — APPOINTMENT (OUTPATIENT)
Dept: SURGERY | Facility: HOSPITAL | Age: 54
End: 2024-04-17
Payer: MEDICAID

## 2024-04-17 ENCOUNTER — TRANSCRIPTION ENCOUNTER (OUTPATIENT)
Age: 54
End: 2024-04-17

## 2024-04-17 ENCOUNTER — OUTPATIENT (OUTPATIENT)
Dept: OUTPATIENT SERVICES | Facility: HOSPITAL | Age: 54
LOS: 1 days | Discharge: HOME | End: 2024-04-17
Payer: MEDICAID

## 2024-04-17 VITALS
RESPIRATION RATE: 15 BRPM | HEART RATE: 80 BPM | SYSTOLIC BLOOD PRESSURE: 112 MMHG | DIASTOLIC BLOOD PRESSURE: 65 MMHG | OXYGEN SATURATION: 97 % | TEMPERATURE: 98 F

## 2024-04-17 VITALS
WEIGHT: 139.99 LBS | OXYGEN SATURATION: 100 % | RESPIRATION RATE: 16 BRPM | HEIGHT: 60 IN | HEART RATE: 84 BPM | TEMPERATURE: 98 F | DIASTOLIC BLOOD PRESSURE: 82 MMHG | SYSTOLIC BLOOD PRESSURE: 144 MMHG

## 2024-04-17 DIAGNOSIS — Z98.89 OTHER SPECIFIED POSTPROCEDURAL STATES: Chronic | ICD-10-CM

## 2024-04-17 DIAGNOSIS — Z90.49 ACQUIRED ABSENCE OF OTHER SPECIFIED PARTS OF DIGESTIVE TRACT: Chronic | ICD-10-CM

## 2024-04-17 DIAGNOSIS — C43.9 MALIGNANT MELANOMA OF SKIN, UNSPECIFIED: Chronic | ICD-10-CM

## 2024-04-17 DIAGNOSIS — E21.0 PRIMARY HYPERPARATHYROIDISM: ICD-10-CM

## 2024-04-17 LAB
PTH INTACT, INTRAOP SPECIMEN 2: SIGNIFICANT CHANGE UP
PTH INTACT, INTRAOP SPECIMEN 3: SIGNIFICANT CHANGE UP
PTH INTACT, INTRAOP SPECIMEN 4: SIGNIFICANT CHANGE UP
PTH INTACT, INTRAOP SPECIMEN 5: SIGNIFICANT CHANGE UP
PTH INTACT, INTRAOP SPECIMEN 6: SIGNIFICANT CHANGE UP
PTH INTACT, INTRAOP SPECIMEN 7: SIGNIFICANT CHANGE UP
PTH INTACT, INTRAOP SPECIMEN 8: SIGNIFICANT CHANGE UP
PTH INTACT, INTRAOP TIMING 2: SIGNIFICANT CHANGE UP
PTH INTACT, INTRAOP TIMING 3: SIGNIFICANT CHANGE UP
PTH INTACT, INTRAOP TIMING 4: SIGNIFICANT CHANGE UP
PTH INTACT, INTRAOP TIMING 5: SIGNIFICANT CHANGE UP
PTH INTACT, INTRAOP TIMING 6: SIGNIFICANT CHANGE UP
PTH INTACT, INTRAOP TIMING 7: SIGNIFICANT CHANGE UP
PTH INTACT, INTRAOP TIMING 8: SIGNIFICANT CHANGE UP
PTH INTACT, INTRAOPERATIVE 2: 110 PG/ML — HIGH (ref 15–65)
PTH INTACT, INTRAOPERATIVE 3: 122 PG/ML — HIGH (ref 15–65)
PTH INTACT, INTRAOPERATIVE 4: 114 PG/ML — HIGH (ref 15–65)
PTH INTACT, INTRAOPERATIVE 5: 125 PG/ML — HIGH (ref 15–65)
PTH INTACT, INTRAOPERATIVE 6: 57 PG/ML — SIGNIFICANT CHANGE UP (ref 15–65)
PTH INTACT, INTRAOPERATIVE 7: 44 PG/ML — SIGNIFICANT CHANGE UP (ref 15–65)
PTH INTACT, INTRAOPERATIVE 8: 38 PG/ML — SIGNIFICANT CHANGE UP (ref 15–65)
PTH-INTACT IO % DIF SERPL: 127 PG/ML — HIGH (ref 15–65)
PTH-INTACT SP1 SERPL-MCNC: SIGNIFICANT CHANGE UP

## 2024-04-17 PROCEDURE — 13132 CMPLX RPR F/C/C/M/N/AX/G/H/F: CPT | Mod: 59

## 2024-04-17 PROCEDURE — 88305 TISSUE EXAM BY PATHOLOGIST: CPT | Mod: 26

## 2024-04-17 PROCEDURE — 88307 TISSUE EXAM BY PATHOLOGIST: CPT | Mod: 26

## 2024-04-17 PROCEDURE — 60252 REMOVAL OF THYROID: CPT

## 2024-04-17 PROCEDURE — 60500 EXPLORE PARATHYROID GLANDS: CPT | Mod: 59

## 2024-04-17 PROCEDURE — 88331 PATH CONSLTJ SURG 1 BLK 1SPC: CPT | Mod: 26

## 2024-04-17 RX ORDER — OXYCODONE HYDROCHLORIDE 5 MG/1
5 TABLET ORAL ONCE
Refills: 0 | Status: DISCONTINUED | OUTPATIENT
Start: 2024-04-17 | End: 2024-04-17

## 2024-04-17 RX ORDER — ACETAMINOPHEN 500 MG
2 TABLET ORAL
Qty: 0 | Refills: 0 | DISCHARGE
Start: 2024-04-17

## 2024-04-17 RX ORDER — ONDANSETRON 8 MG/1
4 TABLET, FILM COATED ORAL ONCE
Refills: 0 | Status: COMPLETED | OUTPATIENT
Start: 2024-04-17 | End: 2024-04-17

## 2024-04-17 RX ORDER — HYDROMORPHONE HYDROCHLORIDE 2 MG/ML
0.5 INJECTION INTRAMUSCULAR; INTRAVENOUS; SUBCUTANEOUS
Refills: 0 | Status: DISCONTINUED | OUTPATIENT
Start: 2024-04-17 | End: 2024-04-17

## 2024-04-17 RX ORDER — FLUTICASONE FUROATE, UMECLIDINIUM BROMIDE AND VILANTEROL TRIFENATATE 200; 62.5; 25 UG/1; UG/1; UG/1
1 POWDER RESPIRATORY (INHALATION)
Refills: 0 | DISCHARGE

## 2024-04-17 RX ORDER — FAMOTIDINE 10 MG/ML
20 INJECTION INTRAVENOUS ONCE
Refills: 0 | Status: COMPLETED | OUTPATIENT
Start: 2024-04-17 | End: 2024-04-17

## 2024-04-17 RX ORDER — METOCLOPRAMIDE HCL 10 MG
10 TABLET ORAL ONCE
Refills: 0 | Status: COMPLETED | OUTPATIENT
Start: 2024-04-17 | End: 2024-04-17

## 2024-04-17 RX ORDER — SODIUM CHLORIDE 9 MG/ML
1000 INJECTION, SOLUTION INTRAVENOUS
Refills: 0 | Status: DISCONTINUED | OUTPATIENT
Start: 2024-04-17 | End: 2024-05-01

## 2024-04-17 RX ORDER — ACETAMINOPHEN 500 MG
650 TABLET ORAL EVERY 6 HOURS
Refills: 0 | Status: DISCONTINUED | OUTPATIENT
Start: 2024-04-17 | End: 2024-05-01

## 2024-04-17 RX ORDER — CALCIUM CARBONATE 500(1250)
2 TABLET ORAL
Qty: 180 | Refills: 0
Start: 2024-04-17 | End: 2024-05-16

## 2024-04-17 RX ORDER — BENZOCAINE AND MENTHOL 5; 1 G/100ML; G/100ML
1 LIQUID ORAL
Refills: 0 | Status: DISCONTINUED | OUTPATIENT
Start: 2024-04-17 | End: 2024-05-01

## 2024-04-17 RX ADMIN — Medication 10 MILLIGRAM(S): at 17:19

## 2024-04-17 RX ADMIN — Medication 2 TABLET(S): at 17:11

## 2024-04-17 RX ADMIN — HYDROMORPHONE HYDROCHLORIDE 0.5 MILLIGRAM(S): 2 INJECTION INTRAMUSCULAR; INTRAVENOUS; SUBCUTANEOUS at 14:30

## 2024-04-17 RX ADMIN — HYDROMORPHONE HYDROCHLORIDE 0.5 MILLIGRAM(S): 2 INJECTION INTRAMUSCULAR; INTRAVENOUS; SUBCUTANEOUS at 15:00

## 2024-04-17 RX ADMIN — HYDROMORPHONE HYDROCHLORIDE 0.5 MILLIGRAM(S): 2 INJECTION INTRAMUSCULAR; INTRAVENOUS; SUBCUTANEOUS at 14:45

## 2024-04-17 RX ADMIN — OXYCODONE HYDROCHLORIDE 5 MILLIGRAM(S): 5 TABLET ORAL at 18:10

## 2024-04-17 RX ADMIN — OXYCODONE HYDROCHLORIDE 5 MILLIGRAM(S): 5 TABLET ORAL at 17:40

## 2024-04-17 RX ADMIN — ONDANSETRON 4 MILLIGRAM(S): 8 TABLET, FILM COATED ORAL at 17:11

## 2024-04-17 RX ADMIN — FAMOTIDINE 20 MILLIGRAM(S): 10 INJECTION INTRAVENOUS at 17:30

## 2024-04-17 RX ADMIN — HYDROMORPHONE HYDROCHLORIDE 0.5 MILLIGRAM(S): 2 INJECTION INTRAMUSCULAR; INTRAVENOUS; SUBCUTANEOUS at 14:15

## 2024-04-17 RX ADMIN — HYDROMORPHONE HYDROCHLORIDE 0.5 MILLIGRAM(S): 2 INJECTION INTRAMUSCULAR; INTRAVENOUS; SUBCUTANEOUS at 15:30

## 2024-04-17 NOTE — ASU DISCHARGE PLAN (ADULT/PEDIATRIC) - CARE PROVIDER_API CALL
Kiana Lebron Isela  Surgery  [address] [place] [address], [facility] [address]  [place] [address]  Phone: [phone]  Fax: [phone]  Established Patient  Follow Up Time: 2 weeks   Kiana Lebron Isela  Surgery  [address] [place] [address], [facility] [address]  [place] [address]  Phone: [phone]  Fax: [phone]  Established Patient  Scheduled Appointment: 04/18/2024 02:00 PM

## 2024-04-17 NOTE — ASU DISCHARGE PLAN (ADULT/PEDIATRIC) - ACTIVITY LEVEL
No exercise/No heavy lifting/No sports/gym wear supportibe bra 24hr/day for at least 3 weeks./No exercise/No heavy lifting/No sports/gym

## 2024-04-17 NOTE — ASU DISCHARGE PLAN (ADULT/PEDIATRIC) - NS MD DC FALL RISK RISK
For information on Fall & Injury Prevention, visit: [email or web address] OR  [email or web address] OR  https://www.cdc.gov/steadi/patient.html

## 2024-04-17 NOTE — ASU DISCHARGE PLAN (ADULT/PEDIATRIC) - PROVIDER TOKENS
PROVIDER:[TOKEN:[[id number]],FOLLOWUP:[2 weeks],ESTABLISHEDPATIENT:[T]] PROVIDER:[TOKEN:[[id number]],SCHEDULEDAPPT:[04/18/2024],SCHEDULEDAPPTTIME:[02:00 PM],ESTABLISHEDPATIENT:[T]]

## 2024-04-17 NOTE — BRIEF OPERATIVE NOTE - OPERATION/FINDINGS
Left thyroidectomy for a thyroid nodule. An adenomatous lesion was identified in the left superior parathyroid. Blood PTH hormone level did not drop by 50% of excision therefore, attention was directed to the right parathyroid. An enlarged gland was identified and a right superior parathyroidectomy was excised. This led to over 50% decrease in PTH levels.   A MARTELL drain was placed.   See operative report

## 2024-04-17 NOTE — ASU DISCHARGE PLAN (ADULT/PEDIATRIC) - FOLLOW UP APPOINTMENTS
may also call Recovery Room (PACU) 24/7 @ [phone]/[facility]

## 2024-04-17 NOTE — ASU DISCHARGE PLAN (ADULT/PEDIATRIC) - PROCEDURE
Parathyroidectomy + Thyroid lobectomy parathyroidectomy and left thryoid lobectomy with paratracheal dissection

## 2024-04-17 NOTE — ASU DISCHARGE PLAN (ADULT/PEDIATRIC) - MEDICATION INSTRUCTIONS
You received IV Tylenol for pain management at 2 PM. Please DO NOT take any Tylenol (Acetaminophen) containing products, such as Vicodin, Percocet, Excedrin, and cold medications for the next 6 hours (until 8 PM). DO NOT TAKE MORE THAN 4000 MG OF TYLENOL in a 24 hour period.

## 2024-04-17 NOTE — ASU DISCHARGE PLAN (ADULT/PEDIATRIC) - CALL YOUR DOCTOR IF YOU HAVE ANY OF THE FOLLOWING:
Swelling that gets worse/Pain not relieved by Medications/Wound/Surgical Site with redness, or foul smelling discharge or pus/Numbness, tingling, color or temperature change to extremity/Nausea and vomiting that does not stop/Unable to urinate/Inability to tolerate liquids or foods

## 2024-04-17 NOTE — BRIEF OPERATIVE NOTE - NSICDXBRIEFPROCEDURE_GEN_ALL_CORE_FT
PROCEDURES:  Lobectomy, thyroid, or total thyroidectomy 17-Apr-2024 14:19:15  Marcello Mullen  Right parathyroidectomy 17-Apr-2024 14:19:28  Marcello Mullen

## 2024-04-18 ENCOUNTER — APPOINTMENT (OUTPATIENT)
Dept: SURGERY | Facility: CLINIC | Age: 54
End: 2024-04-18
Payer: MEDICAID

## 2024-04-18 ENCOUNTER — EMERGENCY (EMERGENCY)
Facility: HOSPITAL | Age: 54
LOS: 1 days | Discharge: HOME | End: 2024-04-18
Attending: EMERGENCY MEDICINE | Admitting: EMERGENCY MEDICINE
Payer: MEDICAID

## 2024-04-18 VITALS
TEMPERATURE: 98 F | SYSTOLIC BLOOD PRESSURE: 153 MMHG | HEART RATE: 82 BPM | OXYGEN SATURATION: 97 % | RESPIRATION RATE: 16 BRPM | DIASTOLIC BLOOD PRESSURE: 76 MMHG

## 2024-04-18 VITALS
SYSTOLIC BLOOD PRESSURE: 142 MMHG | HEART RATE: 88 BPM | OXYGEN SATURATION: 98 % | RESPIRATION RATE: 16 BRPM | HEIGHT: 60 IN | WEIGHT: 138.01 LBS | TEMPERATURE: 98 F | DIASTOLIC BLOOD PRESSURE: 82 MMHG

## 2024-04-18 DIAGNOSIS — E21.0 PRIMARY HYPERPARATHYROIDISM: ICD-10-CM

## 2024-04-18 DIAGNOSIS — Z98.89 OTHER SPECIFIED POSTPROCEDURAL STATES: Chronic | ICD-10-CM

## 2024-04-18 DIAGNOSIS — C43.9 MALIGNANT MELANOMA OF SKIN, UNSPECIFIED: Chronic | ICD-10-CM

## 2024-04-18 DIAGNOSIS — Z90.49 ACQUIRED ABSENCE OF OTHER SPECIFIED PARTS OF DIGESTIVE TRACT: Chronic | ICD-10-CM

## 2024-04-18 PROBLEM — E21.3 HYPERPARATHYROIDISM, UNSPECIFIED: Chronic | Status: ACTIVE | Noted: 2024-04-10

## 2024-04-18 PROBLEM — E04.1 NONTOXIC SINGLE THYROID NODULE: Chronic | Status: ACTIVE | Noted: 2024-04-10

## 2024-04-18 LAB
HCT VFR BLD CALC: 40.3 % — SIGNIFICANT CHANGE UP (ref 34.5–45)
HGB BLD-MCNC: 13.5 G/DL — SIGNIFICANT CHANGE UP (ref 11.5–15.5)
MCHC RBC-ENTMCNC: 29.5 PG — SIGNIFICANT CHANGE UP (ref 27–34)
MCHC RBC-ENTMCNC: 33.5 GM/DL — SIGNIFICANT CHANGE UP (ref 32–36)
MCV RBC AUTO: 88.2 FL — SIGNIFICANT CHANGE UP (ref 80–100)
NRBC # BLD: 0 /100 WBCS — SIGNIFICANT CHANGE UP (ref 0–0)
PLATELET # BLD AUTO: 292 K/UL — SIGNIFICANT CHANGE UP (ref 150–400)
RBC # BLD: 4.57 M/UL — SIGNIFICANT CHANGE UP (ref 3.8–5.2)
RBC # FLD: 13 % — SIGNIFICANT CHANGE UP (ref 10.3–14.5)
WBC # BLD: 9.64 K/UL — SIGNIFICANT CHANGE UP (ref 3.8–10.5)
WBC # FLD AUTO: 9.64 K/UL — SIGNIFICANT CHANGE UP (ref 3.8–10.5)

## 2024-04-18 PROCEDURE — 36415 COLL VENOUS BLD VENIPUNCTURE: CPT

## 2024-04-18 PROCEDURE — 99024 POSTOP FOLLOW-UP VISIT: CPT

## 2024-04-18 PROCEDURE — G2211 COMPLEX E/M VISIT ADD ON: CPT | Mod: NC,1L

## 2024-04-18 PROCEDURE — 85027 COMPLETE CBC AUTOMATED: CPT

## 2024-04-18 PROCEDURE — 99283 EMERGENCY DEPT VISIT LOW MDM: CPT

## 2024-04-18 NOTE — ED ADULT NURSE NOTE - OBJECTIVE STATEMENT
Pt received in bed alert and oriented and resting in bed with the c/o fever and neck pain and swelling. Pt s/p thyroidectomy yesterday and states that she had a fever of 100.6,  2 hours prior to arrival to ED that she took 2/500mg Tylenol. As per Md's orders blood specimen obtained and sent to the lab, pt tolerated well. Nursing care ongoing and safety maintained

## 2024-04-18 NOTE — PHYSICAL EXAM
[de-identified] : MARTELL drain fluid is serous. MARTELL drain removed successfully. No swelling of the neck. Mild bruising seen at the skin surrounding surgical incision site. no warmth or erythema [Normal] : extraocular movements are normal [FreeTextEntry1] : Patient is not in acute distress, sits comfortably in exam room [de-identified] : Grossly intact [de-identified] : Skin:  normal appearance.  no rash, nodules, vesicles, or erythema, Musculoskeletal:  full range of motion and no deformities appreciated Neurological:  grossly intact Psychiatric:  oriented to person, place and time with appropriate affectPsych: normal and appropriate affect

## 2024-04-18 NOTE — ED PROVIDER NOTE - CARE PROVIDER_API CALL
Bernardino Jeter  Otolaryngology  [address] [facility]  [place] [address]  Phone: [phone]  Fax: [phone]  Follow Up Time:

## 2024-04-18 NOTE — ED PROVIDER NOTE - OBJECTIVE STATEMENT
Patient is a 54-year-old white female with history of Tourette's hyperlipidemia fibromyalgia asthma hyperparathyroidism and previous thyroid nodule partial thyroidectomy performed yesterday at [facility] presenting to the emergency department here at [facility] today complaining of pain at incisional site and fever to 100.6.  Patient had outpatient surgery yesterday at [facility] head and neck surgery.  Patient woke up this morning with pain but what concerned her was that she had a temperature of 99.6.  She proceeded to be evaluated by her surgeon and was earlier today the site of which was found to be clean and dry and was sent home.  Pain persisted and temperature was noted to be 100.6 at which point surgeon suggested that she come here for evaluation.  No shaking chills.  Slight pain on swallowing.  No cough no shortness of breath no abdominal pain nausea nausea vomiting diarrhea no dysuria frequency urgency or hesitancy.

## 2024-04-18 NOTE — ED PROVIDER NOTE - NSFOLLOWUPINSTRUCTIONS_ED_ALL_ED_FT
Increase fluids.  May take Tylenol 650 mg every 6 hours if needed  Follow-up with your ENT doctor tomorrow for re-check.  Return here if needed.

## 2024-04-18 NOTE — ED PROVIDER NOTE - PATIENT PORTAL LINK FT
You can access the FollowMyHealth Patient Portal offered by [facility] by registering at the following website: [email or web address]. By joining [facility]’s FollowMyHealth portal, you will also be able to view your health information using other applications (apps) compatible with our system.

## 2024-04-18 NOTE — ED PROVIDER NOTE - DIFFERENTIAL DIAGNOSIS
Postop fever differential includes atelectasis versus local wound infection versus abscess doubt both of latter 2. Differential Diagnosis

## 2024-04-18 NOTE — ED PROVIDER NOTE - CLINICAL SUMMARY MEDICAL DECISION MAKING FREE TEXT BOX
Day 1 postop fever to 100.6 with no associated symptom other than pain at operative site requiring thorough evaluation and CBC.  At this time no indication for CT scan.

## 2024-04-18 NOTE — ED PROVIDER NOTE - NSICDXFAMILYHX_GEN_ALL_CORE_FT
FAMILY HISTORY:  Father  Still living? Yes, Estimated age: 71-80  History of hypertension, Age at diagnosis: Age Unknown     cane

## 2024-04-18 NOTE — ED PROVIDER NOTE - PHYSICAL EXAMINATION
Examination reveals a well-developed well-nourished white female in mild distress secondary to postoperative pain.  HEENT normocephalic atraumatic pupils equal round react light accommodation extraocular movements intact.  Pharynx clear not erythematous.  Neck reveals a healing horizontal scar in the lower anterior neck site of which is clean dry nonerythematous nonpurulent with no fluctuance whatsoever.  Minimal erythema inferior to surgical incision site.  Lungs are clear heart regular rate and rhythm without any murmurs rubs gallops.

## 2024-04-19 NOTE — ASU PATIENT PROFILE, ADULT - MENTAL HEALTH CONDITIONS/SYMPTOMS, PROFILE
The patient is Watcher - Medium risk of patient condition declining or worsening    Shift Goals  Clinical Goals: Monitor labs/VS, I&D, IV ABX  Patient Goals: To fix my leg  Family Goals: NA    Progress made toward(s) clinical / shift goals:      Problem: Fall Risk  Goal: Patient will remain free from falls  Description: Target End Date:  Prior to discharge or change in level of care    Document interventions on the [facility]ano Fall Risk Assessment    1.  Assess for fall risk factors  2.  Implement fall precautions  Outcome: Progressing     Problem: Urinary - Renal Perfusion  Goal: Ability to achieve and maintain adequate renal perfusion and functioning will improve  Description: Target End Date:  Prior to discharge or change in level of care    Document on I/O and Assessment flowsheet    1.  Urine output will remain greater than 0.5ml/Kg/HR  2.  Monitor amount and/or characteristics of urine per order/policy. Specific gravity per order/policy  3.  Assess signs and symptoms of renal dysfunction  Outcome: Progressing              none

## 2024-04-20 ENCOUNTER — NON-APPOINTMENT (OUTPATIENT)
Age: 54
End: 2024-04-20

## 2024-04-20 ENCOUNTER — EMERGENCY (EMERGENCY)
Facility: HOSPITAL | Age: 54
LOS: 1 days | Discharge: HOME | End: 2024-04-20
Attending: STUDENT IN AN ORGANIZED HEALTH CARE EDUCATION/TRAINING PROGRAM | Admitting: STUDENT IN AN ORGANIZED HEALTH CARE EDUCATION/TRAINING PROGRAM
Payer: MEDICAID

## 2024-04-20 VITALS
TEMPERATURE: 98 F | SYSTOLIC BLOOD PRESSURE: 144 MMHG | HEART RATE: 88 BPM | DIASTOLIC BLOOD PRESSURE: 81 MMHG | RESPIRATION RATE: 16 BRPM | OXYGEN SATURATION: 97 % | HEIGHT: 60 IN

## 2024-04-20 VITALS
HEART RATE: 83 BPM | RESPIRATION RATE: 18 BRPM | DIASTOLIC BLOOD PRESSURE: 95 MMHG | TEMPERATURE: 98 F | OXYGEN SATURATION: 100 % | SYSTOLIC BLOOD PRESSURE: 152 MMHG

## 2024-04-20 DIAGNOSIS — C43.9 MALIGNANT MELANOMA OF SKIN, UNSPECIFIED: Chronic | ICD-10-CM

## 2024-04-20 DIAGNOSIS — Z90.49 ACQUIRED ABSENCE OF OTHER SPECIFIED PARTS OF DIGESTIVE TRACT: Chronic | ICD-10-CM

## 2024-04-20 DIAGNOSIS — Z98.89 OTHER SPECIFIED POSTPROCEDURAL STATES: Chronic | ICD-10-CM

## 2024-04-20 LAB
ALBUMIN SERPL ELPH-MCNC: 4.5 G/DL — SIGNIFICANT CHANGE UP (ref 3.3–5)
ALP SERPL-CCNC: 113 U/L — SIGNIFICANT CHANGE UP (ref 40–120)
ALT FLD-CCNC: 141 U/L — HIGH (ref 4–33)
ANION GAP SERPL CALC-SCNC: 13 MMOL/L — SIGNIFICANT CHANGE UP (ref 7–14)
AST SERPL-CCNC: 93 U/L — HIGH (ref 4–32)
BASE EXCESS BLDV CALC-SCNC: 11.2 MMOL/L — HIGH (ref -2–3)
BASOPHILS # BLD AUTO: 0.03 K/UL — SIGNIFICANT CHANGE UP (ref 0–0.2)
BASOPHILS NFR BLD AUTO: 0.5 % — SIGNIFICANT CHANGE UP (ref 0–2)
BILIRUB SERPL-MCNC: 0.6 MG/DL — SIGNIFICANT CHANGE UP (ref 0.2–1.2)
BLOOD GAS VENOUS COMPREHENSIVE RESULT: SIGNIFICANT CHANGE UP
BUN SERPL-MCNC: 10 MG/DL — SIGNIFICANT CHANGE UP (ref 7–23)
CALCIUM SERPL-MCNC: 9.8 MG/DL — SIGNIFICANT CHANGE UP (ref 8.4–10.5)
CHLORIDE BLDV-SCNC: 97 MMOL/L — SIGNIFICANT CHANGE UP (ref 96–108)
CHLORIDE SERPL-SCNC: 97 MMOL/L — LOW (ref 98–107)
CO2 BLDV-SCNC: 38.6 MMOL/L — HIGH (ref 22–26)
CO2 SERPL-SCNC: 29 MMOL/L — SIGNIFICANT CHANGE UP (ref 22–31)
CREAT SERPL-MCNC: 0.86 MG/DL — SIGNIFICANT CHANGE UP (ref 0.5–1.3)
EGFR: 79 ML/MIN/1.73M2 — SIGNIFICANT CHANGE UP
EOSINOPHIL # BLD AUTO: 0.14 K/UL — SIGNIFICANT CHANGE UP (ref 0–0.5)
EOSINOPHIL NFR BLD AUTO: 2.6 % — SIGNIFICANT CHANGE UP (ref 0–6)
GAS PNL BLDV: 134 MMOL/L — LOW (ref 136–145)
GLUCOSE BLDV-MCNC: 90 MG/DL — SIGNIFICANT CHANGE UP (ref 70–99)
GLUCOSE SERPL-MCNC: 93 MG/DL — SIGNIFICANT CHANGE UP (ref 70–99)
HCO3 BLDV-SCNC: 37 MMOL/L — HIGH (ref 22–29)
HCT VFR BLD CALC: 41.5 % — SIGNIFICANT CHANGE UP (ref 34.5–45)
HCT VFR BLDA CALC: 42 % — SIGNIFICANT CHANGE UP (ref 34.5–46.5)
HGB BLD CALC-MCNC: 13.9 G/DL — SIGNIFICANT CHANGE UP (ref 11.7–16.1)
HGB BLD-MCNC: 13.7 G/DL — SIGNIFICANT CHANGE UP (ref 11.5–15.5)
IANC: 3.36 K/UL — SIGNIFICANT CHANGE UP (ref 1.8–7.4)
IMM GRANULOCYTES NFR BLD AUTO: 0.2 % — SIGNIFICANT CHANGE UP (ref 0–0.9)
LACTATE BLDV-MCNC: 1.1 MMOL/L — SIGNIFICANT CHANGE UP (ref 0.5–2)
LYMPHOCYTES # BLD AUTO: 1.52 K/UL — SIGNIFICANT CHANGE UP (ref 1–3.3)
LYMPHOCYTES # BLD AUTO: 27.7 % — SIGNIFICANT CHANGE UP (ref 13–44)
MCHC RBC-ENTMCNC: 29.3 PG — SIGNIFICANT CHANGE UP (ref 27–34)
MCHC RBC-ENTMCNC: 33 GM/DL — SIGNIFICANT CHANGE UP (ref 32–36)
MCV RBC AUTO: 88.9 FL — SIGNIFICANT CHANGE UP (ref 80–100)
MONOCYTES # BLD AUTO: 0.43 K/UL — SIGNIFICANT CHANGE UP (ref 0–0.9)
MONOCYTES NFR BLD AUTO: 7.8 % — SIGNIFICANT CHANGE UP (ref 2–14)
NEUTROPHILS # BLD AUTO: 3.36 K/UL — SIGNIFICANT CHANGE UP (ref 1.8–7.4)
NEUTROPHILS NFR BLD AUTO: 61.2 % — SIGNIFICANT CHANGE UP (ref 43–77)
NRBC # BLD: 0 /100 WBCS — SIGNIFICANT CHANGE UP (ref 0–0)
NRBC # FLD: 0 K/UL — SIGNIFICANT CHANGE UP (ref 0–0)
PCO2 BLDV: 52 MMHG — SIGNIFICANT CHANGE UP (ref 39–52)
PH BLDV: 7.46 — HIGH (ref 7.32–7.43)
PLATELET # BLD AUTO: 287 K/UL — SIGNIFICANT CHANGE UP (ref 150–400)
PO2 BLDV: 21 MMHG — LOW (ref 25–45)
POTASSIUM BLDV-SCNC: 4.1 MMOL/L — SIGNIFICANT CHANGE UP (ref 3.5–5.1)
POTASSIUM SERPL-MCNC: 4.1 MMOL/L — SIGNIFICANT CHANGE UP (ref 3.5–5.3)
POTASSIUM SERPL-SCNC: 4.1 MMOL/L — SIGNIFICANT CHANGE UP (ref 3.5–5.3)
PROT SERPL-MCNC: 7 G/DL — SIGNIFICANT CHANGE UP (ref 6–8.3)
RBC # BLD: 4.67 M/UL — SIGNIFICANT CHANGE UP (ref 3.8–5.2)
RBC # FLD: 13.1 % — SIGNIFICANT CHANGE UP (ref 10.3–14.5)
SAO2 % BLDV: 29.5 % — LOW (ref 67–88)
SODIUM SERPL-SCNC: 139 MMOL/L — SIGNIFICANT CHANGE UP (ref 135–145)
WBC # BLD: 5.49 K/UL — SIGNIFICANT CHANGE UP (ref 3.8–10.5)
WBC # FLD AUTO: 5.49 K/UL — SIGNIFICANT CHANGE UP (ref 3.8–10.5)

## 2024-04-20 PROCEDURE — 70490 CT SOFT TISSUE NECK W/O DYE: CPT | Mod: 26,MC

## 2024-04-20 PROCEDURE — 99285 EMERGENCY DEPT VISIT HI MDM: CPT

## 2024-04-20 PROCEDURE — 99283 EMERGENCY DEPT VISIT LOW MDM: CPT

## 2024-04-20 RX ORDER — MORPHINE SULFATE 50 MG/1
4 CAPSULE, EXTENDED RELEASE ORAL ONCE
Refills: 0 | Status: DISCONTINUED | OUTPATIENT
Start: 2024-04-20 | End: 2024-04-20

## 2024-04-20 RX ORDER — OXYCODONE HYDROCHLORIDE 5 MG/1
1 TABLET ORAL
Qty: 4 | Refills: 0
Start: 2024-04-20 | End: 2024-04-21

## 2024-04-20 RX ORDER — DEXAMETHASONE 0.5 MG/5ML
10 ELIXIR ORAL ONCE
Refills: 0 | Status: COMPLETED | OUTPATIENT
Start: 2024-04-20 | End: 2024-04-20

## 2024-04-20 RX ORDER — SODIUM CHLORIDE 9 MG/ML
1000 INJECTION INTRAMUSCULAR; INTRAVENOUS; SUBCUTANEOUS ONCE
Refills: 0 | Status: COMPLETED | OUTPATIENT
Start: 2024-04-20 | End: 2024-04-20

## 2024-04-20 RX ADMIN — MORPHINE SULFATE 4 MILLIGRAM(S): 50 CAPSULE, EXTENDED RELEASE ORAL at 14:31

## 2024-04-20 RX ADMIN — SODIUM CHLORIDE 1000 MILLILITER(S): 9 INJECTION INTRAMUSCULAR; INTRAVENOUS; SUBCUTANEOUS at 08:34

## 2024-04-20 RX ADMIN — SODIUM CHLORIDE 1000 MILLILITER(S): 9 INJECTION INTRAMUSCULAR; INTRAVENOUS; SUBCUTANEOUS at 13:06

## 2024-04-20 RX ADMIN — MORPHINE SULFATE 4 MILLIGRAM(S): 50 CAPSULE, EXTENDED RELEASE ORAL at 11:09

## 2024-04-20 RX ADMIN — SODIUM CHLORIDE 1000 MILLILITER(S): 9 INJECTION INTRAMUSCULAR; INTRAVENOUS; SUBCUTANEOUS at 15:50

## 2024-04-20 RX ADMIN — MORPHINE SULFATE 4 MILLIGRAM(S): 50 CAPSULE, EXTENDED RELEASE ORAL at 08:35

## 2024-04-20 RX ADMIN — Medication 102 MILLIGRAM(S): at 17:24

## 2024-04-20 RX ADMIN — SODIUM CHLORIDE 1000 MILLILITER(S): 9 INJECTION INTRAMUSCULAR; INTRAVENOUS; SUBCUTANEOUS at 13:10

## 2024-04-20 NOTE — ED PROVIDER NOTE - WORK/EXCUSE FORM DATE
Medication  Discontinued by Dr Peña in clinic 12/15/22  Patient completed 2 years of dual antiplatelet therapy.  We will discontinue Plavix now   22-Apr-2024

## 2024-04-20 NOTE — ED PROVIDER NOTE - CLINICAL SUMMARY MEDICAL DECISION MAKING FREE TEXT BOX
54 year old female with hx of asthma, HLD, recent partial thyroidectomy done on 4/17 comes into the ED for eval of worsening pain to the surgical site. She was seen in [facility] on 4/18 for pain and low grade fever of 100.6 and was discharged home. She continues to have worsening pain at the neck with minimal improvement with oxycodone and motrin. She denies any change in voice, inability to swallow/speak, dyspnea/SOB, chest pain, abd pain, n/v, fevers/chills last 2 days. There is tenderness, erythema, and some ecchymosis overlying anterior neck and upper chest without any crepitus. Symptoms could be secondary to post op pain but given the severity and how it is worsening, concern for abscess at site. Will order CBC, CMP, pain control, CT neck without iv contrast as Pt reports she is anaphylactic to IV contrast. 54 year old female with hx of asthma, HLD, recent partial thyroidectomy done on 4/17 comes into the ED for eval of worsening pain to the surgical site. She was seen in [facility] on 4/18 for pain and low grade fever of 100.6 and was discharged home. She continues to have worsening pain at the neck with minimal improvement with oxycodone and motrin. She denies any change in voice, inability to swallow/speak, dyspnea/SOB, chest pain, abd pain, n/v, fevers/chills last 2 days. There is tenderness, erythema, and some ecchymosis overlying anterior neck and upper chest without any crepitus. Symptoms could be secondary to post op pain but given the severity and how it is worsening, concern for abscess at site. Will order CBC, CMP, pain control, CT neck without iv contrast as Pt reports she is anaphylactic to IV contrast.    Sathya, Attending  See Attestation

## 2024-04-20 NOTE — ED PROVIDER NOTE - PATIENT PORTAL LINK FT
You can access the FollowMyHealth Patient Portal offered by [facility] by registering at the following website: [email or web address]. By joining [employer] [employer]’s FollowMyHealth portal, you will also be able to view your health information using other applications (apps) compatible with our system.

## 2024-04-20 NOTE — CONSULT NOTE ADULT - SUBJECTIVE AND OBJECTIVE BOX
General Surgery Consult      HPI: Haylee Goldberg is a 54 y.o. woman with history of thyroid nodule and hyperparathyroidism who underwent left hemithyroidectomy and parathyroidectomy (4/17/24) who presented to the ED on 4/20 complaining of neck pain.     The patient states that she has had similar pain since the time of her surgery. The patient denies any significant increase in pain prior to presentation. The patient denies any fever, chills, nausea, vomiting, SOB, or wheezing.     The patient does state that she has some pain with swallowing and it feels like there is a "lump in [her] throat." The patient also reports that he voice is hoarse since the time of surgery.       PAST MEDICAL HISTORY:  History of hyperlipidemia  Fibromyalgia  Tourettes disorder  Asthma  Hyperparathyroidism  Thyroid nodule      PAST SURGICAL HISTORY:  History of arthroscopy of both knees  S/P carpal tunnel release  S/P cholecystectomy  Malignant melanoma  Hemithyroidectomy  Parathyroidectomy      MEDICATIONS:  acetaminophen 325 mg oral tablet: 2 tab(s) orally every 6 hours As needed Moderate Pain (4 - 6) (17 Apr 2024 14:50)  allegra - po OTC supplement daily:  (17 Apr 2024 10:44)  calcium-vitamin D 500 mg-5 mcg (200 intl units) oral tablet: 2 tab(s) orally 3 times a day (17 Apr 2024 14:50)  Crestor 10 mg oral tablet: 1 tab(s) orally once a day (at bedtime) (17 Apr 2024 10:44)  ProAir HFA 90 mcg/inh inhalation aerosol: 2 puff(s) inhaled 4 times a day, As Needed (17 Apr 2024 10:44)  Trelegy Ellipta 200 mcg-62.5 mcg-25 mcg/inh inhalation powder: 1 puff(s) inhaled once a day (17 Apr 2024 10:44)      ALLERGIES:  cephalosporins (Anaphylaxis)  penicillin (Anaphylaxis)  contrast media (gadolinium-based) (Anaphylaxis)  shellfish (Anaphylaxis)  sulfa drugs (Rash)  iodine (Anaphylaxis)      VITALS & I/Os:  Vital Signs Last 24 Hrs  T(C): 36.9 (20 Apr 2024 17:30), Max: 36.9 (20 Apr 2024 17:30)  T(F): 98.4 (20 Apr 2024 17:30), Max: 98.4 (20 Apr 2024 17:30)  HR: 83 (20 Apr 2024 17:30) (82 - 88)  BP: 152/95 (20 Apr 2024 17:30) (144/81 - 152/95)  BP(mean): --  RR: 18 (20 Apr 2024 17:30) (16 - 20)  SpO2: 100% (20 Apr 2024 17:30) (97% - 100%)    Parameters below as of 20 Apr 2024 17:30  Patient On (Oxygen Delivery Method): room air      PHYSICAL EXAM:  General: No acute distress  Respiratory: Nonlabored on room air  Neck: ecchymosis surrounding neck incision, no swelling, no warmth, no drainage, minimally tender to palpation  Cardiovascular: normotensive, regular rate  Extremities: Warm      LABS:                        13.7   5.49  )-----------( 287      ( 20 Apr 2024 08:52 )             41.5     04-20    139  |  97<L>  |  10  ----------------------------<  93  4.1   |  29  |  0.86    Ca    9.8      20 Apr 2024 08:52    TPro  7.0  /  Alb  4.5  /  TBili  0.6  /  DBili  x   /  AST  93<H>  /  ALT  141<H>  /  AlkPhos  113  04-20    Lactate:  04-20 @ 08:52  1.1    Urinalysis Basic - ( 20 Apr 2024 08:52 )    Color: x / Appearance: x / SG: x / pH: x  Gluc: 93 mg/dL / Ketone: x  / Bili: x / Urobili: x   Blood: x / Protein: x / Nitrite: x   Leuk Esterase: x / RBC: x / WBC x   Sq Epi: x / Non Sq Epi: x / Bacteria: x      IMAGING:  CT Soft Tissue Neck:  The patient is status post left-sided hemithyroidectomy. Postsurgical gas   as well as edema are seen within the the surrounding soft tissues.    There is streak and beam hardening artifact generated by dental amalgam.   This limits evaluation of the surrounding structures, particularly the   oral cavity. The remainder of the aerodigestive tract has an unremarkable   noncontrast appearance.    No cervical lymphadenopathy is seen.    The salivary glands appear unremarkable.    Evaluation of the neck vessels is limited secondary to exclusion of IV   contrast.    No acute intracranial or orbital abnormalities are imaged.    The paranasal sinuses and tympanomastoid cavities are clear.    The maxilla, mandible, and zygomatic arches are intact. The TMJ spaces   appear within normal limits.    Multilevel cervical spondylosis is seen.    The imaged portions of the lungs are clear.    IMPRESSION: Limited study secondary to exclusion of IV contrast.    Status post recent left-sided hemithyroidectomy with expected   postoperative changes.  
ENT Consult Note    HPI: 54F s/p left thryoid lobectomy, parathyroidectomy (right superior and left superior) 4/17 with Dr. Lebron 4/17 p/w persistent throat pain and difficulty tolerating PO due to pain. Patient reports pain has been worsening and has hoarseness. No respiratory issues. Has been having difficulty tolerating PO. No swelling of the neck. Ecchymosis present on the chest. No fever.       PAST MEDICAL & SURGICAL HISTORY:  History of hyperlipidemia      Fibromyalgia      Tourettes disorder  ("Mild tic")      Asthma  (Only with seasonal allergies)      Hyperparathyroidism      Thyroid nodule      History of arthroscopy of both knees  right 20 yrs ago , left 2013      S/P carpal tunnel release  (Right, 2015 & Left 4/2016)      S/P cholecystectomy      Malignant melanoma          MEDICATIONS  (STANDING):    MEDICATIONS  (PRN):        Vital Signs Last 24 Hrs  T(C): 36.9 (20 Apr 2024 17:30), Max: 36.9 (20 Apr 2024 17:30)  T(F): 98.4 (20 Apr 2024 17:30), Max: 98.4 (20 Apr 2024 17:30)  HR: 83 (20 Apr 2024 17:30) (82 - 88)  BP: 152/95 (20 Apr 2024 17:30) (144/81 - 152/95)  BP(mean): --  RR: 18 (20 Apr 2024 17:30) (16 - 20)  SpO2: 100% (20 Apr 2024 17:30) (97% - 100%)    Parameters below as of 20 Apr 2024 17:30  Patient On (Oxygen Delivery Method): room air      PHYSICAL EXAM:    CONSTITUTIONAL: Well nourished, well developed, NON-DYSMORPHIC, and in no acute distress.    HEAD: normocephalic, atraumatic.  EARS: The right/left pinna was normal.    NOSE: Normal external nose. Anterior nasal cavity patent with no obstruction. Inferior turbinates normally sized.  ORAL CAVITY/OROPHARYNX: normal mucosa. No erythema, lesions or bleeding.  NECK: Incision covered with steristrips, no bleding, neck is soft and flat, ecchymosis over chest, no edema, no crepitus.  RESPIRATORY: Respirations unlabored, no increased work of breathing with use of accessory muscles and retractions. No stridor. Mild raspy vocal quality, no breathiness  CARDIAC: Warm extremities, no cyanosis.      Fiberoptic Nasopharyngolaryngoscopy (NPL):   Nasopharynx wnl  BOT/vallecula normal  Epiglottis sharp  AE folds nonedematous  Arytenoids mobile  Vocal folds mobile bilaterally  Mild infraglottic edema, airway patent  No masses or lesions visualized in post cricoid space or pyriform sinuses bilaterally                          13.7   5.49  )-----------( 287      ( 20 Apr 2024 08:52 )             41.5       04-20    139  |  97<L>  |  10  ----------------------------<  93  4.1   |  29  |  0.86    Ca    9.8      20 Apr 2024 08:52    TPro  7.0  /  Alb  4.5  /  TBili  0.6  /  DBili  x   /  AST  93<H>  /  ALT  141<H>  /  AlkPhos  113  04-20          ACC: [id number] EXAM:  CT NECK SOFT TISSUE   ORDERED BY: FREDERICK ALBA     PROCEDURE DATE:  04/20/2024          INTERPRETATION:  .    CLINICAL INFORMATION: Recent thyroidectomy. Pain. Scan through the   clavicles.    TECHNIQUE: Axial sections were obtained from the skull base to the   sternum without the administration of intravenous contrast. Sagittal and   Coronal reformations were obtained from the source data.    COMPARISON: None available.    FINDINGS: Evaluation is limited secondary to exclusion of IV contrast.    The patient is status post left-sided hemithyroidectomy. Postsurgical gas   as well as edema are seen within the the surrounding soft tissues.    There is streak and beam hardening artifact generated by dental amalgam.   This limits evaluation of the surrounding structures, particularly the   oral cavity. The remainder of the aerodigestive tract has an unremarkable   noncontrast appearance.    No cervical lymphadenopathy is seen.    The salivary glands appear unremarkable.    Evaluation of the neck vessels is limited secondary to exclusion of IV   contrast.    No acute intracranial or orbital abnormalities are imaged.    The paranasal sinuses and tympanomastoid cavities are clear.    The maxilla, mandible, and zygomatic arches are intact. The TMJ spaces   appear within normal limits.    Multilevel cervical spondylosis is seen.    The imaged portions of the lungs are clear.    IMPRESSION: Limited study secondary to exclusion of IV contrast.    Status post recent left-sided hemithyroidectomy with expected   postoperative changes.    --- End of Report ---            LUIS MANUEL FLOYD MD; Attending Radiologist  This document has been electronically signed. Apr 20 2024 10:47AM

## 2024-04-20 NOTE — ED PROVIDER NOTE - CCCP TRG CHIEF CMPLNT
Neurovascular and peripheral vascular checks are WDL to BLE. Instructed not to ambulate without assistance from staff. Pt verbalized understanding. Call light in reach. post operative complication

## 2024-04-20 NOTE — CONSULT NOTE ADULT - ASSESSMENT
ASSESSMENT:  Haylee Goldberg is a 54 y.o. woman with history of thyroid nodule and hyperparathyroidism who underwent left hemithyroidectomy and parathyroidectomy (4/17/24) who presented to the ED on 4/20 complaining of neck pain.     CT demonstrating post-surgical changes without evidence of collection. ENT endoscopy demonstrating infraglottic edema, but intact vocal fold mobility and no masses or lesions.      PLAN:  - No emergent surgical intervention necessary at this time  - Likely post-surgical pain   - Continue acetaminophen + ibuprofen for pain control; oxycodone for breakthrough pain  - Follow up in office as scheduled  - No contraindication to discharge from surgical perspective  - Plan discussed with Dr. Lebron

## 2024-04-20 NOTE — ED PROVIDER NOTE - PHYSICAL EXAMINATION
GENERAL: Not in acute distress, non-toxic appearing  HEAD: normocephalic, atraumatic  HEENT: + erythema and tenderness overlying midline anterior neck with ecchymosis overlying the upper chest. No crepitus overlying neck or upper chest. EOMI, normal conjunctiva, oral mucosa moist, neck supple  CARDIAC: regular rate and rhythm, normal S1 and S2,  no appreciable murmurs  PULM: clear to ascultation bilaterally, no crackles, rales, rhonchi, or wheezing  GI: abdomen nondistended, soft, nontender, no guarding or rebound tenderness  NEURO: alert and oriented x 3, normal speech, gait normal, no gross neurologic deficit  MSK: No visible deformities, no peripheral edema  SKIN: No visible rashes, dry, well-perfused  PSYCH: appropriate mood and affect

## 2024-04-20 NOTE — ED ADULT TRIAGE NOTE - CHIEF COMPLAINT QUOTE
Pt c/o pain and fevers (T100.6F max) s/p partial thyroidectomy on 4/17. Bruising and steri strips noted to neck, no drainage present. Pt able to complete full sentences, airway patent

## 2024-04-20 NOTE — ED PROVIDER NOTE - NSFOLLOWUPINSTRUCTIONS_ED_ALL_ED_FT
You were seen in the ER for neck pain. Your lab results and imaging were within normal limits. You received medications for your pain. The ENT doctor and surgery team evaluated you and cleared you for discharge. You can take tylenol and/or motrin for your pain. Please follow the instructions on the packaging.    A prescription for steroids (Medrol Dosepak) was sent to your pharmacy. Please take it as instructed.    Please follow up with your surgeon.  Please follow up with your primary care doctor.    Please return to the ER if you have worsening symptoms including fever, chest pain, shortness of breath, abdominal pain, nausea, vomiting, diarrhea, weakness or lightheadedness/fainting.

## 2024-04-20 NOTE — ED ADULT NURSE REASSESSMENT NOTE - NS ED NURSE REASSESS COMMENT FT1
Break RN: Patient resting in stretcher, at baseline mental status, no acute distress noted at this time. Respirations equal and unlabored. Medicated as ordered. Care plan continued. Comfort measures provided. Safety maintained. Awaiting ENT consult.

## 2024-04-20 NOTE — ED ADULT NURSE NOTE - NSICDXPASTMEDICALHX_GEN_ALL_CORE_FT
PAST MEDICAL HISTORY:  Asthma (Only with seasonal allergies)    Fibromyalgia     History of hyperlipidemia     Hyperparathyroidism     Thyroid nodule     Tourettes disorder ("Mild tic")    
Spine appears normal, range of motion is not limited, no muscle or joint tenderness

## 2024-04-20 NOTE — ED PROVIDER NOTE - ATTENDING CONTRIBUTION TO CARE
I performed a history and physical exam of the patient and discussed their management with the resident/fellow/ACP/student. I have reviewed the resident/fellow/ACP/student note and agree with the documented findings and plan of care, except as noted. I have personally performed a substantive portion of the visit including all aspects of the medical decision making. My medical decision making and observations are found above. Please refer to any progress notes for updates on clinical course.    54-year-old female with recent thyroidectomy on Wednesday presenting with neck pain. Patient recently in the ER 2 days ago for similar complaints at home after basic blood work.  Patient reports persistent neck pain at surgical site.  Had low-grade fevers of 100.6 at last ER visit but denies any repeat fevers.  Denies any chills, N/V/D, cough but reports mild redness at site with no discharge/swelling.  Reports pain not controlled with Tylenol, Motrin, oxycodone. Reports pain with eating but able to tolerate p.o.  Denies any chest pain, shortness of breath or difficulty breathing, or changes in voice.    Gen: WDWN, NAD, comfortable appearing, afebrile, hemodynamically stable    HEENT: Mild erythema and tenderness overlying midline anterior neck with ecchymoses overlying the upper chest, no crepitus/fluctuance, site c/d/i with no discharge, mucous membranes moist, no oropharyngeal edema/erythema/exudates   CV: RRR, +S1/S2, no M/R/G, equal b/l radial pulses 2+  Resp: CTAB, no W/R/R, SPO2 >95% on RA, no increased WOB, no stridor   GI: Abdomen soft non-distended, NTTP, no masses/organomegaly   MSK/Skin: No open wounds  Neuro: A&Ox4, moving all 4 extremities spontaneously  Psych: appropriate mood     MDM:   54-year-old female with recent thyroidectomy on Wednesday presenting with neck pain, Recent ER visit 2 days ago for low-grade fever/neck pain, dced after negative blood work with no imaging, persistent pain but reports resolution of fevers, neck with no crepitus/fluctuance but mild erythema and site c/d/i with no discharge, no stridor, tolerating p.o., afebrile, hemodynamically stable.  Exam/history concerning for but not limited to underlying abscess versus other postsurgical complication.  Will obtain CT neck, basic labs, and reassess.  Patient reports anaphylactic reaction to IV contrast.  Will opt to get CT neck Noncon, ENT consult and reassess

## 2024-04-20 NOTE — ED PROVIDER NOTE - PROGRESS NOTE DETAILS
Dain Millard PGY2:  spoke with surg and reviewed Pt's presentation, labs, CT. They will come and eval Pt. Dain Millard PGY2:  Surg evaluated Pt. They requested ENT consult for scope. Spoke with ENT who will come and scope the pt. Dain Millard PGY2:  Pt is starting to have some pain again however she would like to wait until ENT gets here before getting another dose of morphine prior to scoping. Valdo Camacho- ent noted mild infraglottic edema, no edema to cords which are moving appropriately. no c/f hematoma or infection. rec decadron, dc with medrol dose pack. spoke to surgery who agrees with plan. will dc. pt updated on results and plan.

## 2024-04-20 NOTE — ED PROVIDER NOTE - OBJECTIVE STATEMENT
54 year old female with hx of asthma, HLD, recent partial thyroidectomy done on 4/17 comes into the ED for eval of worsening pain to the surgical site. She was seen in [facility] on 4/18 for pain and low grade fever of 100.6 and was discharged home. She continues to have worsening pain at the neck with minimal improvement with oxycodone and motrin. She last took motrin this morning at 6:30AM. The pain is worse with certain movements and she has been having some difficulty with PO intake secondary to pain. She denies any change in voice, inability to swallow/speak, dyspnea/SOB, chest pain, abd pain, n/v. She has not had fevers or chills over the past 2 days.

## 2024-04-20 NOTE — ED ADULT NURSE NOTE - OBJECTIVE STATEMENT
pt pain and discomfort to surgical inc on neck  pt had suergery 3 days ago.  slight reddness noted/  pt evaluated b y attending and residents  iv placed in rt ac 20 g  fluids hung and labs sent off/  pt medicated as ordered/

## 2024-04-20 NOTE — CONSULT NOTE ADULT - ASSESSMENT
54yF s/p left thryoid lobectomy, parathyroidectomy (right superior and left superior) 4/17 with Dr. Lebron 4/17 p/w throat/neck pain, hoarseness, and difficulty tolerating PO due to pain. No respiratory distress. Neck is soft and flat, no edema or signs or symptoms to suggest hematoma or infection. Ecchymosis on chest likely due to retraction intraoperatively. Mild infraglottic edema on scope, otherwise airway is patent and vocal folds are mobile bilaterally. No masses or lesions.     - Diet as tolerated  - recommend Decadron IV 10mg x1  - recommend medrol dosepak  - remainder of care per Primary Surgical team  - f/u with Dr. Lebron as scheduled  - return if acute worsening or difficulty breathing

## 2024-04-25 ENCOUNTER — APPOINTMENT (OUTPATIENT)
Dept: SURGERY | Facility: CLINIC | Age: 54
End: 2024-04-25
Payer: MEDICAID

## 2024-04-25 DIAGNOSIS — C73 MALIGNANT NEOPLASM OF THYROID GLAND: ICD-10-CM

## 2024-04-25 DIAGNOSIS — D34 BENIGN NEOPLASM OF THYROID GLAND: ICD-10-CM

## 2024-04-25 DIAGNOSIS — D49.7 NEOPLASM OF UNSPECIFIED BEHAVIOR OF ENDOCRINE GLANDS AND OTHER PARTS OF NERVOUS SYSTEM: ICD-10-CM

## 2024-04-25 LAB — SURGICAL PATHOLOGY STUDY: SIGNIFICANT CHANGE UP

## 2024-04-25 PROCEDURE — G2211 COMPLEX E/M VISIT ADD ON: CPT | Mod: NC,1L

## 2024-04-25 PROCEDURE — 99024 POSTOP FOLLOW-UP VISIT: CPT

## 2024-04-25 NOTE — ASSESSMENT
[FreeTextEntry1] : Patient s/p left thyroid lobectomy and bilateral superior parathyroidectomy.  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  Discussed need for close follow-up with ultrasound and blood monitoring.  No additional surgery recommended at this time.  On physical exam, there is no neck swelling. There is improving bruising anterior chest.  Etiology of patient's pain complaint is unclear.  Structural or infectious etiologies have been ruled out.  Patient reassured and instructed to continue with Motrin as needed.  Blood work sent.  The patient is scheduled to see Dr. Luna in 1 month.  Blood work will be performed at that time to confirm euthyroid.  RTO 4 months.  With follow-up thyroid/neck ultrasound in 6 months. I reviewed the expected course of illness and the intent of current treatment with the patient. I have answered her questions.

## 2024-04-25 NOTE — PHYSICAL EXAM
[Normal] : no neck adenopathy [de-identified] : Incision healing without erythema or swelling, scar min discussed. no cervical or supraclavicular adenopathy, trachea midline, thyroid without palpable mass [FreeTextEntry1] : Patient is not in acute distress, sits comfortably in exam room [de-identified] : Grossly intact [de-identified] : Skin:  normal appearance.  no rash, nodules, vesicles, or erythema, Musculoskeletal:  full range of motion and no deformities appreciated Neurological:  grossly intact Psychiatric:  oriented to person, place and time with appropriate affectPsych: normal and appropriate affect

## 2024-04-25 NOTE — HISTORY OF PRESENT ILLNESS
[de-identified] :  Patient referred by Dr. Luna for evaluation of suspicious thyroid nodule and primary hyperparathyroidism. 1 year ago patient noted to have elevated calcium with history of IBS and osteopenia. Patient denies prior history of thyroid disease, dysphagia, change in voice, kidney stones, radiation exposure, hypertension, fatigue or bone pain. Blood work September 2023: Calcium 10.8, PTH 91, creatinine 0.8. Thyroid ultrasound January 2024: Right lobe 4 x 1.4 x 1.2 cm, no nodules noted. Left lobe 5.3 x 2.8 x 2.3 cm with lower nodule 3.5 x 2.5 x 2.8 cm. 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  Patient underwent left thyroid lobectomy with paratracheal dissection and bilateral superior parathyroidectomy on 4/17/24.  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  Patient has complained of severe throat pain since the surgery without relief using oxycodone, Motrin, Tylenol or topical treatments.  Was seen in the emergency room several times with laryngoscopy revealing normal vocal cord movement.  CT scan without fluid collection, or abscess. images consistent with expected postop change. Patient given steroid taper and omeprazole by ENT in ER.  patient has seen additional ENT earlier this week. no cause of pain or abnormality identified reports pain worsens after talking.  I have reviewed all old and new data and available images. Additional information was obtained from others present at the time of the visit to ensure the completeness of the history.

## 2024-04-26 ENCOUNTER — NON-APPOINTMENT (OUTPATIENT)
Age: 54
End: 2024-04-26

## 2024-04-26 LAB
25(OH)D3 SERPL-MCNC: 34.4 NG/ML
CALCIUM SERPL-MCNC: 10 MG/DL
CALCIUM SERPL-MCNC: 10 MG/DL
PARATHYROID HORMONE INTACT: 56 PG/ML

## 2024-04-29 ENCOUNTER — NON-APPOINTMENT (OUTPATIENT)
Age: 54
End: 2024-04-29

## 2024-04-30 ENCOUNTER — APPOINTMENT (OUTPATIENT)
Dept: OTOLARYNGOLOGY | Facility: CLINIC | Age: 54
End: 2024-04-30
Payer: MEDICAID

## 2024-04-30 VITALS
HEART RATE: 96 BPM | SYSTOLIC BLOOD PRESSURE: 143 MMHG | HEIGHT: 61 IN | DIASTOLIC BLOOD PRESSURE: 85 MMHG | BODY MASS INDEX: 25.49 KG/M2 | WEIGHT: 135 LBS

## 2024-04-30 PROCEDURE — 31575 DIAGNOSTIC LARYNGOSCOPY: CPT

## 2024-04-30 PROCEDURE — 99213 OFFICE O/P EST LOW 20 MIN: CPT | Mod: 25

## 2024-04-30 RX ORDER — FAMOTIDINE 20 MG/1
20 TABLET, FILM COATED ORAL
Qty: 90 | Refills: 0 | Status: ACTIVE | COMMUNITY
Start: 2024-04-30 | End: 1900-01-01

## 2024-04-30 RX ORDER — FLUTICASONE FUROATE, UMECLIDINIUM BROMIDE AND VILANTEROL TRIFENATATE 200; 62.5; 25 UG/1; UG/1; UG/1
POWDER RESPIRATORY (INHALATION)
Refills: 0 | Status: ACTIVE | COMMUNITY

## 2024-04-30 RX ORDER — FEXOFENADINE HCL 60 MG
CAPSULE ORAL
Refills: 0 | Status: ACTIVE | COMMUNITY

## 2024-04-30 NOTE — DATA REVIEWED
[de-identified] : CT neck 4/20/24: FINDINGS: Evaluation is limited secondary to exclusion of IV contrast.  The patient is status post left-sided hemithyroidectomy. Postsurgical gas as well as edema are seen within the the surrounding soft tissues.  There is streak and beam hardening artifact generated by dental amalgam. This limits evaluation of the surrounding structures, particularly the oral cavity. The remainder of the aerodigestive tract has an unremarkable noncontrast appearance.  No cervical lymphadenopathy is seen.  The salivary glands appear unremarkable.  Evaluation of the neck vessels is limited secondary to exclusion of IV contrast.  No acute intracranial or orbital abnormalities are imaged.  The paranasal sinuses and tympanomastoid cavities are clear.  The maxilla, mandible, and zygomatic arches are intact. The TMJ spaces appear within normal limits.  Multilevel cervical spondylosis is seen.  The imaged portions of the lungs are clear.  IMPRESSION: Limited study secondary to exclusion of IV contrast.  Status post recent left-sided hemithyroidectomy with expected postoperative changes.

## 2024-04-30 NOTE — HISTORY OF PRESENT ILLNESS
[de-identified] : Haylee Goldberg is a 55 yo female who presents for evaluation of throat issue. 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 Lebron. Since then, she has had significant throat pain and raspiness of voice. She was seen in [place] ED and [facility] ED, and found to have vocal cord edema. She had CT neck showing expected postop changes. She notes odynophagia. She denies aspiration episodes or heartburn. No recent fevers or chills. She denies significant neck swelling. She did have some chest ecchymosis which resolved.

## 2024-04-30 NOTE — PHYSICAL EXAM
[Midline] : trachea located in midline position [Laryngoscopy Performed] : laryngoscopy was performed, see procedure section for findings [Normal] : no rashes [de-identified] : Neck incision c/d/i. No significant swelling or palpable underlying fluid collection.

## 2024-04-30 NOTE — ASSESSMENT
[FreeTextEntry1] : Haylee Goldberg presents for evaluation of throat pain and dysphonia. She underwent left hemithyroidectomy with another physician on 4/15/24. She had subsequent throat pain and dysphonia. She has been seen in ED twice, scoped by ENT, and had CT neck which showed postop changes. Flexible laryngoscopy today shows significant postcricoid inflammation but normal bilateral vocal cord motion. She has been taking omeprazole qd. Will add famotidine to regimen for laryngopharyngeal reflux.  - Continue omeprazole qd. - Start famotidine 20 mg qhs. - f/u in 3 weeks.

## 2024-05-14 ENCOUNTER — APPOINTMENT (OUTPATIENT)
Dept: OTOLARYNGOLOGY | Facility: CLINIC | Age: 54
End: 2024-05-14
Payer: MEDICAID

## 2024-05-14 PROCEDURE — 31579 LARYNGOSCOPY TELESCOPIC: CPT | Mod: GN

## 2024-05-14 PROCEDURE — 92524 BEHAVRAL QUALIT ANALYS VOICE: CPT | Mod: GN

## 2024-05-29 ENCOUNTER — NON-APPOINTMENT (OUTPATIENT)
Age: 54
End: 2024-05-29

## 2024-05-29 ENCOUNTER — APPOINTMENT (OUTPATIENT)
Dept: OTOLARYNGOLOGY | Facility: CLINIC | Age: 54
End: 2024-05-29
Payer: MEDICAID

## 2024-05-29 PROCEDURE — 92507 TX SP LANG VOICE COMM INDIV: CPT | Mod: GN

## 2024-05-30 ENCOUNTER — APPOINTMENT (OUTPATIENT)
Dept: OTOLARYNGOLOGY | Facility: CLINIC | Age: 54
End: 2024-05-30
Payer: MEDICAID

## 2024-05-30 VITALS
SYSTOLIC BLOOD PRESSURE: 127 MMHG | HEART RATE: 105 BPM | WEIGHT: 137 LBS | BODY MASS INDEX: 25.86 KG/M2 | DIASTOLIC BLOOD PRESSURE: 81 MMHG | HEIGHT: 61 IN

## 2024-05-30 DIAGNOSIS — K21.9 GASTRO-ESOPHAGEAL REFLUX DISEASE W/OUT ESOPHAGITIS: ICD-10-CM

## 2024-05-30 DIAGNOSIS — R49.0 DYSPHONIA: ICD-10-CM

## 2024-05-30 PROCEDURE — 99213 OFFICE O/P EST LOW 20 MIN: CPT

## 2024-05-30 NOTE — PHYSICAL EXAM
[de-identified] : Neck incision well healed. [Midline] : trachea located in midline position [Normal] : no rashes

## 2024-05-30 NOTE — DATA REVIEWED
[de-identified] : SLP videostroboscopy 5/14/24: VIDEOSTROBOSCOPY: The patient was explained the videostroboscopy procedure, and consent was obtained. A nasendoscope was passed via the left nare without difficulty and positioned above the supraglottic structures. The arytenoids, aryepiglottic folds, true and false vocal folds are clearly visible with mild edema and mild-moderate edema noted. Trace secretions noted along medial edges of TVF which clear following cued cough and subsequent swallow. Assessment of bilateral true vocal cords during abduction revealed bands that are equal in color, length and contour without evidence of polyps, nodules or cysts appreciated. Assessment of vocal fold adduction revealed bilateral motion with complete closure during phonation. However, periods of frequent hyperfunction was observed across various pitch levels and complexity. Increased AP tension further observed. At this point, the scope was carefully removed with the patient tolerating the procedure without difficulty.  BEHAVIORAL VOICE EVALUATION: The patient demonstrates a mild-moderate dysphonia marked by hoarseness, increased perilaryngeal tension, reduced phonatory-respiratory coordination and frequent throat clearing observed. Vocal resonance is WFL.  EDUCATION: Evaluation results and films were discussed with the patient.  PROGNOSIS: Good for functional communication.  IMPRESSIONS: Dysphonia  GOALS: 1)The patient will reduce perilaryngeal muscle tension via relaxation and laryngeal massage exercises by 25%. 2) The patient will improve phonatory-respiratory coordination via breathing exercises by 25%. 3) The patient will reduce strain placed on TVF during phonation designed to improve overall phonatory function via easy onset and frontal focus tasks by 25%.  RECOMMENDATIONS: 1) Consider course of voice therapy 1x/week for 6-8 weeks to maximize communicative function. 2) f/u with referring MD as directed. and Speech/ Language/ Voice Evaluation.

## 2024-05-30 NOTE — ASSESSMENT
[FreeTextEntry1] : Haylee Goldberg presents for follow-up of throat pain and dysphonia. She underwent left hemithyroidectomy with another physician on 4/15/24. She had subsequent throat pain and dysphonia. She has been seen in ED twice, scoped by ENT, and had CT neck which showed postop changes. Flexible laryngoscopy at last visit showed significant postcricoid inflammation but normal bilateral vocal cord motion. She has been on antireflux medication. She had videostroboscopy showing muscle tension dysphonia and has started voice therapy. She has stopped trelegy as a side effect of this medication can lead to dysphonia and throat tension. She notes significant improvement in symptoms. Voice and swallow have improved.  - Continue voice therapy. - f/u after completion.

## 2024-05-30 NOTE — HISTORY OF PRESENT ILLNESS
[de-identified] : Haylee Goldberg is a 53 yo female who presents for evaluation of throat issue. 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 Lebron. Since then, she has had significant throat pain and raspiness of voice. She was seen in [place] [facility] and [facility] ED, and found to have vocal cord edema. She had CT neck showing expected postop changes. She notes odynophagia. She denies aspiration episodes or heartburn. No recent fevers or chills. She denies significant neck swelling. She did have some chest ecchymosis which resolved. [FreeTextEntry1] : 5/30/24 - Ms. Goldberg presents for follow-up. She stopped trelegy and notes significant improvement. She notes improved voice and swallow. She notes mild sore throat and odynophagia. She denies neck pain or aspiration. No fevers. She is in voice therapy.

## 2024-06-06 ENCOUNTER — APPOINTMENT (OUTPATIENT)
Dept: OTOLARYNGOLOGY | Facility: CLINIC | Age: 54
End: 2024-06-06
Payer: MEDICAID

## 2024-06-06 PROCEDURE — 92507 TX SP LANG VOICE COMM INDIV: CPT | Mod: GN

## 2024-06-14 ENCOUNTER — APPOINTMENT (OUTPATIENT)
Dept: OTOLARYNGOLOGY | Facility: CLINIC | Age: 54
End: 2024-06-14
Payer: MEDICAID

## 2024-06-14 PROCEDURE — 92507 TX SP LANG VOICE COMM INDIV: CPT | Mod: GN

## 2024-06-19 ENCOUNTER — APPOINTMENT (OUTPATIENT)
Dept: OTOLARYNGOLOGY | Facility: CLINIC | Age: 54
End: 2024-06-19

## 2024-10-01 ENCOUNTER — APPOINTMENT (OUTPATIENT)
Dept: SURGERY | Facility: CLINIC | Age: 54
End: 2024-10-01

## 2025-02-06 NOTE — ASU PATIENT PROFILE, ADULT - TEACHING/LEARNING FACTORS INFLUENCE READINESS TO LEARN
PT AAOx4 visiting with family at start of shift. Pain well controlled with PRN medications. External catheter to suction in use. Wound to left breast assessed by WC team and dressed. Dressing change due 2/7.IVF d/c'd. Pt slept soundly throughout shift. CHG bath given by PCT. Plan of care was reviewed. Safety maintained.     Problem: Pain Acute  Goal: Optimal Pain Control and Function  Outcome: Progressing     Problem: Mobility Impairment  Goal: Optimal Mobility  Outcome: Progressing     Problem: Wound  Goal: Optimal Coping  Outcome: Progressing  Goal: Optimal Pain Control and Function  Outcome: Progressing  Goal: Skin Health and Integrity  Outcome: Progressing  Goal: Optimal Wound Healing  Outcome: Progressing      none

## 2025-03-26 NOTE — HISTORY OF PRESENT ILLNESS
No care due was identified.  [facility] Embedded Care Due Messages. Reference number: [id number].   3/26/2025 3:30:20 PM CDT   [de-identified] :  Patient referred by Dr. Luna for evaluation of suspicious thyroid nodule and primary hyperparathyroidism. 1 year ago patient noted to have elevated calcium with history of IBS and osteopenia. Patient denies prior history of thyroid disease, dysphagia, change in voice, kidney stones, radiation exposure, hypertension, fatigue or bone pain. Blood work September 2023: Calcium 10.8, PTH 91, creatinine 0.8. Thyroid ultrasound January 2024: Right lobe 4 x 1.4 x 1.2 cm, no nodules noted. Left lobe 5.3 x 2.8 x 2.3 cm with lower nodule 3.5 x 2.5 x 2.8 cm. 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  Patient underwent left thyroid lobectomy with paratracheal dissection and bilateral superior parathyroidectomy on 4/17/24. Here for MARTELL drain removal. She reports she is having neck pain and took 5 mg oxycodone for this. She had 20 cc emptied from MARTELL drain this morning. She denies swelling of the neck or shortness of breath. She has some throat discomfort when swallowing since the surgery. She denies fever, paresthesia's. She is taking calcium 1000 mg three times daily.  I have reviewed all old and new data and available images. Additional information was obtained from others present at the time of the visit to ensure the completeness of the history.

## 2025-06-04 ENCOUNTER — NON-APPOINTMENT (OUTPATIENT)
Age: 55
End: 2025-06-04

## 2025-06-06 ENCOUNTER — APPOINTMENT (OUTPATIENT)
Dept: OTOLARYNGOLOGY | Facility: CLINIC | Age: 55
End: 2025-06-06
Payer: MEDICAID

## 2025-06-06 VITALS
BODY MASS INDEX: 26.81 KG/M2 | SYSTOLIC BLOOD PRESSURE: 121 MMHG | DIASTOLIC BLOOD PRESSURE: 79 MMHG | WEIGHT: 142 LBS | HEIGHT: 61 IN | HEART RATE: 91 BPM

## 2025-06-06 PROCEDURE — 99214 OFFICE O/P EST MOD 30 MIN: CPT | Mod: 25

## 2025-06-06 PROCEDURE — 31575 DIAGNOSTIC LARYNGOSCOPY: CPT

## 2025-06-06 RX ORDER — ALBUTEROL 90 MCG
AEROSOL (GRAM) INHALATION
Refills: 0 | Status: ACTIVE | COMMUNITY

## 2025-06-06 RX ORDER — BUDESONIDE, GLYCOPYRROLATE, AND FORMOTEROL FUMARATE 160; 9; 4.8 UG/1; UG/1; UG/1
AEROSOL, METERED RESPIRATORY (INHALATION)
Refills: 0 | Status: ACTIVE | COMMUNITY

## 2025-06-06 RX ORDER — TRIAMTERENE AND HYDROCHLOROTHIAZIDE 25; 37.5 MG/1; MG/1
37.5-25 TABLET ORAL
Refills: 0 | Status: ACTIVE | COMMUNITY

## 2025-06-06 RX ORDER — LEVOTHYROXINE SODIUM 0.07 MG/1
75 TABLET ORAL
Refills: 0 | Status: ACTIVE | COMMUNITY

## 2025-06-06 RX ORDER — ROSUVASTATIN CALCIUM 20 MG/1
20 TABLET, FILM COATED ORAL
Refills: 0 | Status: ACTIVE | COMMUNITY

## (undated) DEVICE — LABELS BLANK W PEN

## (undated) DEVICE — TUBING RAPIDVAC SMOKE EVACUATOR .25" X 10FT

## (undated) DEVICE — DRAPE MAGNETIC INSTRUMENT MEDIUM

## (undated) DEVICE — VISITEC 4X4

## (undated) DEVICE — SOL IRR BAG H2O 2000ML

## (undated) DEVICE — LAP PAD W RING 18 X 18"

## (undated) DEVICE — DRAPE TOWEL BLUE 17" X 24"

## (undated) DEVICE — VENODYNE/SCD SLEEVE CALF MEDIUM

## (undated) DEVICE — DRAPE 3/4 SHEET 52X76"

## (undated) DEVICE — DRAPE FLUID WARMER 44 X 44"

## (undated) DEVICE — ELCTR BOVIE PENCIL SMOKE EVACUATION

## (undated) DEVICE — ELCTR BOVIE PENCIL BLADE 10FT

## (undated) DEVICE — DRAIN JACKSON PRATT 7MM FLAT FULL NO TROCAR

## (undated) DEVICE — SYR ASEPTO

## (undated) DEVICE — CANISTER DISPOSABLE THIN WALL 3000CC

## (undated) DEVICE — DRAPE LAPAROTOMY TRANSVERSE

## (undated) DEVICE — SUT MONOCRYL 4-0 27" PS-2 UNDYED

## (undated) DEVICE — BIPOLAR FORCEP KIRWAN JEWELERS STR 4" X 0.4MM W 12FT CORD (GREEN)

## (undated) DEVICE — ELCTR GROUNDING PAD ADULT COVIDIEN

## (undated) DEVICE — PACK HEAD & NECK

## (undated) DEVICE — SUT CHROMIC 3-0 27" SH

## (undated) DEVICE — SUT VICRYL 2-0 18" TIES UNDYED

## (undated) DEVICE — DRSG XEROFORM 5 X 9"

## (undated) DEVICE — DRSG BENZOIN 0.6CC

## (undated) DEVICE — PROTECTOR HEEL / ELBOW FLUFFY

## (undated) DEVICE — SUT VICRYL 3-0 18" TIES UNDYED

## (undated) DEVICE — SUT ETHILON 3-0 18" FS-1

## (undated) DEVICE — POSITIONER FOAM EGG CRATE ULNAR 2PCS (PINK)

## (undated) DEVICE — SUT VICRYL 0 18" TIES UNDYED

## (undated) DEVICE — PREP BETADINE KIT